# Patient Record
Sex: FEMALE | Race: WHITE | HISPANIC OR LATINO | Employment: UNEMPLOYED | URBAN - METROPOLITAN AREA
[De-identification: names, ages, dates, MRNs, and addresses within clinical notes are randomized per-mention and may not be internally consistent; named-entity substitution may affect disease eponyms.]

---

## 2017-01-30 ENCOUNTER — GENERIC CONVERSION - ENCOUNTER (OUTPATIENT)
Dept: OTHER | Facility: OTHER | Age: 52
End: 2017-01-30

## 2017-04-26 ENCOUNTER — ALLSCRIPTS OFFICE VISIT (OUTPATIENT)
Dept: OTHER | Facility: OTHER | Age: 52
End: 2017-04-26

## 2017-11-15 ENCOUNTER — ALLSCRIPTS OFFICE VISIT (OUTPATIENT)
Dept: OTHER | Facility: OTHER | Age: 52
End: 2017-11-15

## 2018-01-11 NOTE — PSYCH
Psych Med Mgmt    Appearance: was calm and cooperative, adequate hygiene and grooming and good eye contact  Observed mood: depressed and anxious  Observed mood: affect was constricted  Speech: a normal rate and fluent  Thought processes: coherent/organized  Hallucinations: no hallucinations present  Thought Content: no delusions  Abnormal Thoughts: The patient has no suicidal thoughts and no homicidal thoughts  Orientation: The patient is oriented to person, place and time, oriented to person, oriented to place and oriented to time  Recent and Remote Memory: short term memory intact and long term memory intact  Attention Span And Concentration: concentration intact  Insight: Limited insight  Judgment: Her judgment was limited  Muscle Strength And Tone  Muscle strength and tone were normal    The patient is experiencing moderate to severe pain  Goals addressed in session: Medication Management       Treatment Recommendations: Continue current medications  Risks, Benefits And Possible Side Effects Of Medications: Risks, benefits, and possible side effects of medications explained to patient and patient verbalizes understanding  The patient has been filling controlled prescriptions on time as prescribed to [facility] [facility] program       She reports normal appetite, normal energy level, no weight change and normal number of sleep hours  Mood has been depressed, since she lost her father last August, she has been going to her grief, her chronic physical problems  She stated her 's social security check claiming that he was overpaid since he showed being single in his records  Assessment    1  Generalized anxiety disorder (300 02) (F41 1)   2  Severe recurrent major depression without psychotic features (296 33) (F33 2)    Plan    1   From  LamoTRIgine 25 MG Oral Tablet Start 1 tablet po bid for 7 days then   increase to 2 tabs po bid To LamoTRIgine 25 MG Oral Tablet take 2 tablet twice daily    2  ALPRAZolam 2 MG Oral Tablet; TAKE 1 TABLET 3 TIMES DAILY   3  Desvenlafaxine  MG Oral Tablet Extended Release 24 Hour; TAKE 1   TABLET Once In The Morning    Review of Systems    Constitutional: No fever, no chills, feels well, no tiredness, no recent weight gain or loss  Cardiovascular: no complaints of slow or fast heart rate, no chest pain, no palpitations  Respiratory: no complaints of shortness of breath, no wheezing, no dyspnea on exertion  Gastrointestinal: no complaints of abdominal pain, no constipation, no nausea, no diarrhea, no vomiting  Genitourinary: no complaints of dysuria, no incontinence, no pelvic pain, no urinary frequency  Musculoskeletal: no complaints of arthralgia, no myalgias, no limb pain, no joint stiffness  Integumentary: no complaints of skin rash, no itching, no dry skin  Neurological: no complaints of headache, no confusion, no numbness, no dizziness  Substance Abuse Hx    Substance Abuse History: Denies  Active Problems    1  Generalized anxiety disorder (300 02) (F41 1)   2  Severe recurrent major depression without psychotic features (296 33) (F33 2)    Past Medical History    The active problems and past medical history were reviewed and updated today  Allergies    1  Penicillins    Current Meds   1  ALPRAZolam 2 MG Oral Tablet; TAKE 1 TABLET 3 TIMES DAILY; Therapy: 21Jun2016 to (Evaluate:24Apr2018); Last Rx:26Oct2017 Ordered   2  Desvenlafaxine  MG Oral Tablet Extended Release 24 Hour; TAKE 1 TABLET   Once In The Morning; Therapy: 14Apr2014 to ()  Requested for: 26Oct2017; Last   Rx:26Oct2017 Ordered   3  Fluticasone Propionate 50 MCG/ACT Nasal Suspension; Therapy: 08PBT4347 to Recorded   4  LamoTRIgine 25 MG Oral Tablet; Start 1 tablet po bid for 7 days then increase to 2 tabs   po bid;    Therapy: 53Awc2824 to (Evaluate:29Nov2017)  Requested for: 05Zpn0227; Last   Rx:21Qpx8316 Ordered   5  Lisinopril 5 MG Oral Tablet; Therapy: 21Apr2014 to Recorded   6  Lyrica 75 MG Oral Capsule; Therapy: 10TMI7439 to Recorded   7  Methocarbamol 750 MG Oral Tablet; Therapy: 68Wdo1388 to Recorded   8  Naproxen 375 MG Oral Tablet; Therapy: 83EDD0056 to Recorded   9  Naproxen  MG Oral Tablet Delayed Release; Therapy: 47OXJ9947 to Recorded   10  Omeprazole 20 MG Oral Capsule Delayed Release; Therapy: 21Apr2014 to Recorded   11  Ondansetron 4 MG Oral Tablet Disintegrating; Therapy: 41EAA3172 to Recorded   12  ProAir  (90 Base) MCG/ACT Inhalation Aerosol Solution; Therapy: 50FUJ3491 to Recorded   13  RaNITidine HCl - 150 MG Oral Capsule; Therapy: 61ILI7139 to Recorded   14  Simvastatin 20 MG Oral Tablet; Therapy: 62OSC4683 to Recorded   15  Symbicort 160-4 5 MCG/ACT Inhalation Aerosol; Therapy: 72WWI3010 to Recorded    The medication list was reviewed and updated today  Family Psych History    The family history was reviewed and updated today  Social History    · Never A Smoker  The social history was reviewed and updated today  See HPI      End of Encounter Meds    1  LamoTRIgine 25 MG Oral Tablet; take 2 tablet twice daily; Therapy: 63Gew9735 to (Evaluate:56Unp5401)  Requested for: 45RMW5564; Last   Rx:47Wsi4292 Ordered    2  ALPRAZolam 2 MG Oral Tablet; TAKE 1 TABLET 3 TIMES DAILY; Therapy: 21Jun2016 to (Evaluate:78Gjo7922); Last Rx:26Oct2017 Ordered   3  Desvenlafaxine  MG Oral Tablet Extended Release 24 Hour; TAKE 1 TABLET   Once In The Morning; Therapy: 14Apr2014 to ([id number])  Requested for: 83AGL4783; Last   Rx:26Oct2017 Ordered    4  Fluticasone Propionate 50 MCG/ACT Nasal Suspension; Therapy: 67YRD1957 to Recorded   5  Lisinopril 5 MG Oral Tablet; Therapy: 21Apr2014 to Recorded   6  Lyrica 75 MG Oral Capsule; Therapy: 73DCS5248 to Recorded   7   Methocarbamol 750 MG Oral Tablet; Therapy: 16Mhe1898 to Recorded   8  Naproxen 375 MG Oral Tablet; Therapy: 89KKA9666 to Recorded   9  Naproxen  MG Oral Tablet Delayed Release; Therapy: 97YYE7001 to Recorded   10  Omeprazole 20 MG Oral Capsule Delayed Release; Therapy: 75Luc2601 to Recorded   11  Ondansetron 4 MG Oral Tablet Disintegrating; Therapy: 41HEV5042 to Recorded   12  ProAir  (90 Base) MCG/ACT Inhalation Aerosol Solution; Therapy: 82SIJ2166 to Recorded   13  RaNITidine HCl - 150 MG Oral Capsule; Therapy: 70MGZ0543 to Recorded   14  Simvastatin 20 MG Oral Tablet; Therapy: 90EXF7565 to Recorded   15  Symbicort 160-4 5 MCG/ACT Inhalation Aerosol;     Therapy: 24YEB4220 to Recorded    Signatures   Electronically signed by : MOSHE Sewell ; Nov 15 2017 12:02PM EST                       (Author)

## 2018-01-12 NOTE — MISCELLANEOUS
Message  Return to work or school:   John Luong is under my professional care  She was seen in my office on 11/15/2017       Patient is being treated for the following diagnoses:Major Depressive Disorder severe and recurrent without psychosis and Generalized Anxiety Disorder  Belinda Cunningham MD       Signatures   Electronically signed by : MOSHE Harding ; Nov 15 2017 12:01PM EST                       (Author)

## 2018-01-13 NOTE — PSYCH
Psych Med Mgmt    Appearance: was calm and cooperative, adequate hygiene and grooming and good eye contact  Observed mood: depressed and anxious  Observed mood: affect was constricted  Speech: a normal rate and fluent  Thought processes: coherent/organized  Hallucinations: no hallucinations present  Thought Content: no delusions  Abnormal Thoughts: The patient has no suicidal thoughts and no homicidal thoughts  Orientation: The patient is oriented to person, place and time, oriented to person, oriented to place and oriented to time  Recent and Remote Memory: short term memory intact and long term memory intact  Attention Span And Concentration: concentration impaired  Insight: Limited insight  Judgment: Her judgment was limited  Muscle Strength And Tone  Muscle strength and tone were normal         Goals addressed in session: Medication Management       Treatment Recommendations: Continue current medications  Risks, Benefits And Possible Side Effects Of Medications: Risks, benefits, and possible side effects of medications explained to patient and patient verbalizes understanding  She reports decreased appetite, normal energy level, no weight change and decrease in number of sleep hours   Mood has been depressed and anxious  She stated that they have to move to small apartment to simplify her daily routine since she is helping to care for her  that had a stoke and has MS  They are also raising a small child  She feels tense and is unable to get a good night sleep  Assessment    1  Generalized anxiety disorder (300 02) (F41 1)   2  Severe recurrent major depression without psychotic features (296 33) (F33 2)    Plan    1  CarBAMazepine 200 MG Oral Tablet; Take 1 tablet twice daily    2  Diazepam 10 MG Oral Tablet   3   ALPRAZolam 2 MG Oral Tablet; TAKE 1 TABLET 3 TIMES DAILY   4  Desvenlafaxine  MG Oral Tablet Extended Release 24 Hour; TAKE 1   TABLET Once In The Morning    Review of Systems    Constitutional: No fever, no chills, feels well, no tiredness, no recent weight gain or loss and as noted in HPI  Cardiovascular: no complaints of slow or fast heart rate, no chest pain, no palpitations  Respiratory: no complaints of shortness of breath, no wheezing, no dyspnea on exertion  Gastrointestinal: no complaints of abdominal pain, no constipation, no nausea, no diarrhea, no vomiting  Genitourinary: no complaints of dysuria, no incontinence, no pelvic pain, no urinary frequency  Musculoskeletal: no complaints of arthralgia, no myalgias, no limb pain, no joint stiffness  Integumentary: no complaints of skin rash, no itching, no dry skin  Neurological: no complaints of headache, no confusion, no numbness, no dizziness  Substance Abuse Hx    Substance Abuse History: Denies  Active Problems    1  Generalized anxiety disorder (300 02) (F41 1)   2  Severe recurrent major depression without psychotic features (296 33) (F33 2)    Past Medical History    The active problems and past medical history were reviewed and updated today  Allergies    1  Penicillins    Current Meds   1  CarBAMazepine 200 MG Oral Tablet; Take 1 tablet twice daily; Therapy: 21IZG6784 to (Evaluate:67Ebk7442)  Requested for: 25Apr2016; Last   Rx:25Apr2016 Ordered   2  Desvenlafaxine  MG Oral Tablet Extended Release 24 Hour; TAKE 1 TABLET   Once In The Morning; Therapy: 82Chc9314 to (Evaluate:06Jun2016)  Requested for: 13NBF6436; Last   Rx:08Mar2016 Ordered   3  Diazepam 10 MG Oral Tablet; TAKE 1 TABLET 3 TIMES DAILY; Therapy: 63IWQ9194 to (Evaluate:06Jun2016); Last Rx:08Mar2016 Ordered   4  Fluticasone Propionate 50 MCG/ACT Nasal Suspension; Therapy: 82MEN3096 to Recorded   5  Lisinopril 5 MG Oral Tablet; Therapy: 18Xmd2916 to Recorded   6  Lyrica 75 MG Oral Capsule; Therapy: 35KPE6713 to Recorded   7  Methocarbamol 750 MG Oral Tablet;    Therapy: 52Xue4263 to Recorded   8  Naproxen 375 MG Oral Tablet; Therapy: 05DIJ5633 to Recorded   9  Naproxen  MG Oral Tablet Delayed Release; Therapy: 02CJR9867 to Recorded   10  Omeprazole 20 MG Oral Capsule Delayed Release; Therapy: 39Pwz7440 to Recorded   11  Ondansetron 4 MG Oral Tablet Dispersible; Therapy: 48QNV7972 to Recorded   12  ProAir  (90 Base) MCG/ACT Inhalation Aerosol Solution; Therapy: 47PPR5362 to Recorded   13  Ranitidine HCl - 150 MG Oral Capsule; Therapy: 91DXE5062 to Recorded   14  Simvastatin 20 MG Oral Tablet; Therapy: 06JMK9250 to Recorded   15  Symbicort 160-4 5 MCG/ACT Inhalation Aerosol; Therapy: 45FHY5599 to Recorded    The medication list was reviewed and updated today  Family Psych History    The family history was reviewed and updated today  Social History    · Never A Smoker  The social history was reviewed and updated today  End of Encounter Meds    1  CarBAMazepine 200 MG Oral Tablet; Take 1 tablet twice daily; Therapy: 13YAW8768 to (Evaluate:79Qqr6126)  Requested for: 21Jun2016; Last   Rx:21Jun2016 Ordered    2  ALPRAZolam 2 MG Oral Tablet; TAKE 1 TABLET 3 TIMES DAILY; Therapy: 21Jun2016 to (Evaluate:09Brj9889); Last Rx:21Jun2016 Ordered   3  Desvenlafaxine  MG Oral Tablet Extended Release 24 Hour; TAKE 1 TABLET   Once In The Morning; Therapy: 14Apr2014 to (Evaluate:60Xew1076)  Requested for: 21Jun2016; Last   Rx:21Jun2016 Ordered    4  Fluticasone Propionate 50 MCG/ACT Nasal Suspension; Therapy: 43OHE7282 to Recorded   5  Lisinopril 5 MG Oral Tablet; Therapy: 41Ldo8586 to Recorded   6  Lyrica 75 MG Oral Capsule; Therapy: 06QQT3492 to Recorded   7  Methocarbamol 750 MG Oral Tablet; Therapy: 94Oyn3521 to Recorded   8  Naproxen 375 MG Oral Tablet; Therapy: 30UBD8553 to Recorded   9  Naproxen  MG Oral Tablet Delayed Release; Therapy: 27KKG0518 to Recorded   10  Omeprazole 20 MG Oral Capsule Delayed Release;     Therapy: 34Fsf3786 to Recorded   11  Ondansetron 4 MG Oral Tablet Dispersible; Therapy: 38QWO0767 to Recorded   12  ProAir  (90 Base) MCG/ACT Inhalation Aerosol Solution; Therapy: 39FRS8485 to Recorded   13  Ranitidine HCl - 150 MG Oral Capsule; Therapy: 99DTS0718 to Recorded   14  Simvastatin 20 MG Oral Tablet; Therapy: 43OYZ4100 to Recorded   15  Symbicort 160-4 5 MCG/ACT Inhalation Aerosol;     Therapy: 65AQI6008 to Recorded    Signatures   Electronically signed by : MOSHE Medellin ; Jun 21 2016 10:44AM EST                       (Author)

## 2018-01-18 NOTE — PSYCH
Psych Med Mgmt    Appearance: was calm and cooperative, adequate hygiene and grooming and good eye contact  Observed mood: depressed and anxious  Observed mood: affect appropriate  Speech: a normal rate and fluent  Thought processes: coherent/organized  Hallucinations: no hallucinations present  Thought Content: no delusions  Abnormal Thoughts: The patient has no suicidal thoughts and no homicidal thoughts  Orientation: The patient is oriented to person, place and time, oriented to person, oriented to place and oriented to time  Recent and Remote Memory: short term memory intact and long term memory intact  Attention Span And Concentration: concentration intact  Insight: Limited insight  Judgment: Her judgment was limited  Muscle Strength And Tone  Muscle strength and tone were normal    The patient is experiencing moderate to severe pain  Chronic back pain  Goals addressed in session: Medication Management       Treatment Recommendations: Continue current medications  Risks, Benefits And Possible Side Effects Of Medications: Risks, benefits, and possible side effects of medications explained to patient and patient verbalizes understanding  She reports decreased appetite, normal energy level, recent lbs weight loss and normal number of sleep hours  Mood has been stable  She is very busy taking care of her  and her son that is 8 yo  Her  is doing well  She is dealing with chronic pain (back pain) but she will not agree to second surgery since she cannot afford the down time while she recovers  Assessment    1  Severe recurrent major depression without psychotic features (296 33) (F33 2)   2  Generalized anxiety disorder (300 02) (F41 1)    Plan    1  CarBAMazepine 200 MG Oral Tablet; Take 1 tablet twice daily    2  ALPRAZolam 2 MG Oral Tablet; TAKE 1 TABLET 3 TIMES DAILY; Do Not Fill   Before: 62PFA1983   3   Desvenlafaxine  MG Oral Tablet Extended Release 24 Hour; TAKE 1   TABLET Once In The Morning    Review of Systems    Constitutional: as noted in HPI  Substance Abuse Hx    Substance Abuse History: Denies  Active Problems    1  Generalized anxiety disorder (300 02) (F41 1)   2  Severe recurrent major depression without psychotic features (296 33) (F33 2)    Past Medical History    The active problems and past medical history were reviewed and updated today  Allergies    1  Penicillins    Current Meds   1  ALPRAZolam 2 MG Oral Tablet; TAKE 1 TABLET 3 TIMES DAILY; Therapy: 21Jun2016 to (Evaluate:08Jun2017); Last Rx:10Mar2017 Ordered   2  CarBAMazepine 200 MG Oral Tablet; Take 1 tablet twice daily; Therapy: 26NAP4657 to (Evaluate:05Jun2017)  Requested for: 80NSO3429; Last   Rx:07Mar2017 Ordered   3  Desvenlafaxine  MG Oral Tablet Extended Release 24 Hour; TAKE 1 TABLET   Once In The Morning; Therapy: 75Cov1791 to (Evaluate:05Jun2017)  Requested for: 94RGH9851; Last   Rx:07Mar2017 Ordered   4  Fluticasone Propionate 50 MCG/ACT Nasal Suspension; Therapy: 62APC4644 to Recorded   5  Lisinopril 5 MG Oral Tablet; Therapy: 27Wpo6590 to Recorded   6  Lyrica 75 MG Oral Capsule; Therapy: 23AIO8495 to Recorded   7  Methocarbamol 750 MG Oral Tablet; Therapy: 40Qpk2964 to Recorded   8  Naproxen 375 MG Oral Tablet; Therapy: 67HDS1068 to Recorded   9  Naproxen  MG Oral Tablet Delayed Release; Therapy: 42WGI5225 to Recorded   10  Omeprazole 20 MG Oral Capsule Delayed Release; Therapy: 27Eep3110 to Recorded   11  Ondansetron 4 MG Oral Tablet Dispersible; Therapy: 65LPV1999 to Recorded   12  ProAir  (90 Base) MCG/ACT Inhalation Aerosol Solution; Therapy: 96YSG0703 to Recorded   13  RaNITidine HCl - 150 MG Oral Capsule; Therapy: 90XMP9929 to Recorded   14  Simvastatin 20 MG Oral Tablet; Therapy: 50VHL3529 to Recorded   15  Symbicort 160-4 5 MCG/ACT Inhalation Aerosol;     Therapy: 80HKG0740 to Recorded    The medication list was reviewed and updated today  Family Psych History    The family history was reviewed and updated today  Social History    · Never A Smoker  The social history was reviewed and updated today  The social history was reviewed and is unchanged  End of Encounter Meds    1  CarBAMazepine 200 MG Oral Tablet; Take 1 tablet twice daily; Therapy: 73MWS6884 to (Evaluate:04Dec2017)  Requested for: 26Apr2017; Last   Rx:26Apr2017 Ordered    2  ALPRAZolam 2 MG Oral Tablet; TAKE 1 TABLET 3 TIMES DAILY; Therapy: 08Nyw0097 to (Evaluate:38Wkh7958); Last Rx:26Apr2017 Ordered   3  Desvenlafaxine  MG Oral Tablet Extended Release 24 Hour; TAKE 1 TABLET   Once In The Morning; Therapy: 14Apr2014 to (Evaluate:04Dec2017)  Requested for: 26Apr2017; Last   Rx:26Apr2017 Ordered    4  Fluticasone Propionate 50 MCG/ACT Nasal Suspension; Therapy: 62WHC6427 to Recorded   5  Lisinopril 5 MG Oral Tablet; Therapy: 21Apr2014 to Recorded   6  Lyrica 75 MG Oral Capsule; Therapy: 03SZC0980 to Recorded   7  Methocarbamol 750 MG Oral Tablet; Therapy: 10Zto7146 to Recorded   8  Naproxen 375 MG Oral Tablet; Therapy: 25XYG7882 to Recorded   9  Naproxen  MG Oral Tablet Delayed Release; Therapy: 53LMD7404 to Recorded   10  Omeprazole 20 MG Oral Capsule Delayed Release; Therapy: 45Dao8076 to Recorded   11  Ondansetron 4 MG Oral Tablet Dispersible; Therapy: 59IUM0327 to Recorded   12  ProAir  (90 Base) MCG/ACT Inhalation Aerosol Solution; Therapy: 90SMC6541 to Recorded   13  RaNITidine HCl - 150 MG Oral Capsule; Therapy: 20WDP1176 to Recorded   14  Simvastatin 20 MG Oral Tablet; Therapy: 52RJJ1858 to Recorded   15  Symbicort 160-4 5 MCG/ACT Inhalation Aerosol;     Therapy: 64SKB8391 to Recorded    Signatures   Electronically signed by : MOSHE Shabazz ; Apr 26 2017 10:11AM EST                       (Author)

## 2018-03-07 NOTE — PSYCH
Message  Patient No Show Letter - Behavioral Health:     Date: 01/30/2017     Dear Parisa Liao,     We missed seeing you for a scheduled appointment at [facility] on 1-27-17 at 9:40am with Dr Brad Chan  Our goal is to offer the best possible care to our patients, so we are concerned when you are unable to keep a scheduled appointment  Please call us at [phone] so that we can reschedule the appointment for a date and time that will work for you  We understand that circumstances may arise which make it impossible for you to keep a scheduled appointment  Should this happen in the future, please call us as soon as you know the appointment will be missed  The earlier you let us know, the more likely we can offer your scheduled appointment time to another patient  We hope to hear from you soon       Sincerely,   Dr Pete Centeno   Electronically signed by : Dontae Cyr, ; Jan 30 2017  8:53AM EST                       (Author)

## 2018-03-27 ENCOUNTER — TELEPHONE (OUTPATIENT)
Dept: BEHAVIORAL/MENTAL HEALTH CLINIC | Facility: CLINIC | Age: 53
End: 2018-03-27

## 2018-03-27 DIAGNOSIS — F33.2 MDD (MAJOR DEPRESSIVE DISORDER), RECURRENT SEVERE, WITHOUT PSYCHOSIS (HCC): Primary | ICD-10-CM

## 2018-03-27 RX ORDER — DESVENLAFAXINE 100 MG/1
1 TABLET, EXTENDED RELEASE ORAL EVERY MORNING
Qty: 30 TABLET | Refills: 2 | Status: SHIPPED | OUTPATIENT
Start: 2018-03-27 | End: 2018-06-13 | Stop reason: SDUPTHER

## 2018-03-27 RX ORDER — DESVENLAFAXINE 100 MG/1
1 TABLET, EXTENDED RELEASE ORAL EVERY MORNING
COMMUNITY
Start: 2014-04-14 | End: 2018-03-27 | Stop reason: SDUPTHER

## 2018-03-28 ENCOUNTER — TELEPHONE (OUTPATIENT)
Dept: PSYCHIATRY | Facility: CLINIC | Age: 53
End: 2018-03-28

## 2018-03-28 NOTE — TELEPHONE ENCOUNTER
When speaking with Oralia ponce prior auth approval, she related she will need med refills until her May apt:  Lamotrigine and alprazolam  Has medication for now  Can wait until Dr Aydin Hair

## 2018-04-03 DIAGNOSIS — F33.2 MDD (MAJOR DEPRESSIVE DISORDER), RECURRENT SEVERE, WITHOUT PSYCHOSIS (HCC): Primary | ICD-10-CM

## 2018-04-03 RX ORDER — LAMOTRIGINE 25 MG/1
50 TABLET ORAL 2 TIMES DAILY
Qty: 120 TABLET | Refills: 2 | Status: SHIPPED | OUTPATIENT
Start: 2018-04-03 | End: 2018-06-13 | Stop reason: SDUPTHER

## 2018-04-03 RX ORDER — ALPRAZOLAM 2 MG/1
1 TABLET ORAL 3 TIMES DAILY
COMMUNITY
Start: 2016-06-21 | End: 2018-04-03 | Stop reason: SDUPTHER

## 2018-04-03 RX ORDER — LAMOTRIGINE 25 MG/1
2 TABLET ORAL 2 TIMES DAILY
COMMUNITY
Start: 2017-08-31 | End: 2018-04-03 | Stop reason: SDUPTHER

## 2018-04-03 RX ORDER — ALPRAZOLAM 2 MG/1
2 TABLET ORAL 3 TIMES DAILY
Qty: 90 TABLET | Refills: 2 | Status: SHIPPED | OUTPATIENT
Start: 2018-04-03 | End: 2018-06-13 | Stop reason: SDUPTHER

## 2018-04-04 ENCOUNTER — TELEPHONE (OUTPATIENT)
Dept: PSYCHIATRY | Facility: CLINIC | Age: 53
End: 2018-04-04

## 2018-06-13 DIAGNOSIS — F33.2 MDD (MAJOR DEPRESSIVE DISORDER), RECURRENT SEVERE, WITHOUT PSYCHOSIS (HCC): ICD-10-CM

## 2018-06-13 RX ORDER — DESVENLAFAXINE 100 MG/1
1 TABLET, EXTENDED RELEASE ORAL EVERY MORNING
Qty: 30 TABLET | Refills: 2 | Status: SHIPPED | OUTPATIENT
Start: 2018-06-13 | End: 2018-10-22 | Stop reason: SDUPTHER

## 2018-06-13 RX ORDER — LAMOTRIGINE 25 MG/1
50 TABLET ORAL 2 TIMES DAILY
Qty: 120 TABLET | Refills: 2 | Status: SHIPPED | OUTPATIENT
Start: 2018-06-13 | End: 2018-12-20 | Stop reason: SDUPTHER

## 2018-06-13 RX ORDER — ALPRAZOLAM 2 MG/1
2 TABLET ORAL 3 TIMES DAILY
Qty: 90 TABLET | Refills: 2 | Status: SHIPPED | OUTPATIENT
Start: 2018-06-13 | End: 2018-09-05 | Stop reason: SDUPTHER

## 2018-08-10 ENCOUNTER — DOCUMENTATION (OUTPATIENT)
Dept: PSYCHIATRY | Facility: CLINIC | Age: 53
End: 2018-08-10

## 2018-08-10 NOTE — PROGRESS NOTES
Treatment Plan not completed within required time limits due to: cancelled appointment   on 8/6/2018 and No Fu appointment scheduled at this time

## 2018-09-05 DIAGNOSIS — F33.2 MDD (MAJOR DEPRESSIVE DISORDER), RECURRENT SEVERE, WITHOUT PSYCHOSIS (HCC): ICD-10-CM

## 2018-09-05 RX ORDER — ALPRAZOLAM 2 MG/1
2 TABLET ORAL 3 TIMES DAILY
Qty: 90 TABLET | Refills: 0 | Status: SHIPPED | OUTPATIENT
Start: 2018-09-05 | End: 2018-10-22 | Stop reason: SDUPTHER

## 2018-10-09 ENCOUNTER — DOCUMENTATION (OUTPATIENT)
Dept: PSYCHIATRY | Facility: CLINIC | Age: 53
End: 2018-10-09

## 2018-10-09 NOTE — PROGRESS NOTES
Treatment Plan not completed within required time limits due to: cancelled appointment  on 10/10/2018 and RS 12/28/18

## 2018-10-22 DIAGNOSIS — F33.2 MDD (MAJOR DEPRESSIVE DISORDER), RECURRENT SEVERE, WITHOUT PSYCHOSIS (HCC): ICD-10-CM

## 2018-10-23 DIAGNOSIS — F33.2 MDD (MAJOR DEPRESSIVE DISORDER), RECURRENT SEVERE, WITHOUT PSYCHOSIS (HCC): ICD-10-CM

## 2018-10-24 NOTE — TELEPHONE ENCOUNTER
Oralia called today to request her meds be refilled  She mentioned she did call prior to today, but her husbands meds were sent to the pharmacy, and not hers

## 2018-10-25 RX ORDER — ALPRAZOLAM 2 MG/1
TABLET ORAL
Qty: 90 TABLET | Refills: 2 | Status: SHIPPED | OUTPATIENT
Start: 2018-10-25 | End: 2018-12-20 | Stop reason: SDUPTHER

## 2018-10-25 RX ORDER — DESVENLAFAXINE 100 MG/1
1 TABLET, EXTENDED RELEASE ORAL EVERY MORNING
Qty: 30 TABLET | Refills: 0 | Status: SHIPPED | OUTPATIENT
Start: 2018-10-25 | End: 2019-02-22 | Stop reason: SDUPTHER

## 2018-10-25 RX ORDER — ALPRAZOLAM 2 MG/1
2 TABLET ORAL 3 TIMES DAILY
Qty: 90 TABLET | Refills: 0 | Status: SHIPPED | OUTPATIENT
Start: 2018-10-25 | End: 2019-02-22 | Stop reason: SDUPTHER

## 2018-10-25 RX ORDER — DESVENLAFAXINE 100 MG/1
TABLET, EXTENDED RELEASE ORAL
Qty: 30 TABLET | Refills: 1 | Status: SHIPPED | OUTPATIENT
Start: 2018-10-25 | End: 2018-12-20 | Stop reason: SDUPTHER

## 2018-12-20 DIAGNOSIS — F33.2 MDD (MAJOR DEPRESSIVE DISORDER), RECURRENT SEVERE, WITHOUT PSYCHOSIS (HCC): ICD-10-CM

## 2018-12-20 RX ORDER — DESVENLAFAXINE 100 MG/1
1 TABLET, EXTENDED RELEASE ORAL EVERY MORNING
Qty: 30 TABLET | Refills: 2 | Status: SHIPPED | OUTPATIENT
Start: 2018-12-20 | End: 2019-02-22 | Stop reason: SDUPTHER

## 2018-12-20 RX ORDER — LAMOTRIGINE 25 MG/1
50 TABLET ORAL 2 TIMES DAILY
Qty: 120 TABLET | Refills: 2 | Status: SHIPPED | OUTPATIENT
Start: 2018-12-20 | End: 2019-02-22 | Stop reason: SDUPTHER

## 2018-12-20 RX ORDER — ALPRAZOLAM 2 MG/1
2 TABLET ORAL 3 TIMES DAILY
Qty: 90 TABLET | Refills: 2 | Status: SHIPPED | OUTPATIENT
Start: 2018-12-20 | End: 2019-02-22 | Stop reason: SDUPTHER

## 2018-12-24 ENCOUNTER — DOCUMENTATION (OUTPATIENT)
Dept: PSYCHIATRY | Facility: CLINIC | Age: 53
End: 2018-12-24

## 2018-12-24 NOTE — PROGRESS NOTES
# 1Treatment Plan not completed within required time limits due to : Provider will be out of the office 12/28/18  and will reschedule at a later time

## 2019-02-22 ENCOUNTER — OFFICE VISIT (OUTPATIENT)
Dept: PSYCHIATRY | Facility: CLINIC | Age: 54
End: 2019-02-22
Payer: COMMERCIAL

## 2019-02-22 ENCOUNTER — TELEPHONE (OUTPATIENT)
Dept: PSYCHIATRY | Facility: CLINIC | Age: 54
End: 2019-02-22

## 2019-02-22 DIAGNOSIS — F33.2 MDD (MAJOR DEPRESSIVE DISORDER), RECURRENT SEVERE, WITHOUT PSYCHOSIS (HCC): ICD-10-CM

## 2019-02-22 PROBLEM — F31.9 BIPOLAR DISORDER (HCC): Status: ACTIVE | Noted: 2019-02-22

## 2019-02-22 PROBLEM — D51.9 B12 DEFICIENCY ANEMIA: Status: ACTIVE | Noted: 2019-02-22

## 2019-02-22 PROBLEM — E78.2 MIXED HYPERLIPIDEMIA: Status: ACTIVE | Noted: 2017-02-27

## 2019-02-22 PROBLEM — D50.9 IRON DEFICIENCY ANEMIA: Status: ACTIVE | Noted: 2019-02-22

## 2019-02-22 PROBLEM — Z98.84 HISTORY OF BARIATRIC SURGERY: Status: ACTIVE | Noted: 2017-08-09

## 2019-02-22 PROCEDURE — 99213 OFFICE O/P EST LOW 20 MIN: CPT | Performed by: PSYCHIATRY & NEUROLOGY

## 2019-02-22 RX ORDER — ALBUTEROL SULFATE 90 UG/1
AEROSOL, METERED RESPIRATORY (INHALATION)
COMMUNITY
Start: 2015-11-02 | End: 2019-02-22 | Stop reason: SDUPTHER

## 2019-02-22 RX ORDER — AMOXICILLIN 250 MG
CAPSULE ORAL
COMMUNITY
Start: 2019-01-30 | End: 2020-09-23

## 2019-02-22 RX ORDER — ALBUTEROL SULFATE 1.25 MG/3ML
1.25 SOLUTION RESPIRATORY (INHALATION) EVERY 6 HOURS PRN
COMMUNITY
Start: 2017-08-09

## 2019-02-22 RX ORDER — DESVENLAFAXINE 100 MG/1
100 TABLET, EXTENDED RELEASE ORAL DAILY
Qty: 30 TABLET | Refills: 2 | Status: SHIPPED | OUTPATIENT
Start: 2019-02-22 | End: 2019-07-15 | Stop reason: SDUPTHER

## 2019-02-22 RX ORDER — METHOCARBAMOL 750 MG/1
TABLET, FILM COATED ORAL
COMMUNITY
Start: 2014-08-05

## 2019-02-22 RX ORDER — BUDESONIDE AND FORMOTEROL FUMARATE DIHYDRATE 160; 4.5 UG/1; UG/1
AEROSOL RESPIRATORY (INHALATION)
COMMUNITY
Start: 2018-11-01

## 2019-02-22 RX ORDER — DESVENLAFAXINE 50 MG/1
100 TABLET, EXTENDED RELEASE ORAL DAILY
COMMUNITY
Start: 2013-11-05 | End: 2019-02-22 | Stop reason: SDUPTHER

## 2019-02-22 RX ORDER — SIMVASTATIN 20 MG
20 TABLET ORAL
COMMUNITY
Start: 2014-07-21

## 2019-02-22 RX ORDER — ALPRAZOLAM 2 MG/1
2 TABLET ORAL 3 TIMES DAILY
Qty: 90 TABLET | Refills: 2 | Status: SHIPPED | OUTPATIENT
Start: 2019-02-22 | End: 2019-07-15 | Stop reason: SDUPTHER

## 2019-02-22 RX ORDER — RANITIDINE 150 MG/1
150 CAPSULE ORAL
COMMUNITY
Start: 2015-07-06

## 2019-02-22 RX ORDER — LAMOTRIGINE 25 MG/1
50 TABLET ORAL 2 TIMES DAILY
Qty: 120 TABLET | Refills: 2 | Status: SHIPPED | OUTPATIENT
Start: 2019-02-22 | End: 2019-07-15 | Stop reason: SDUPTHER

## 2019-02-22 RX ORDER — ASPIRIN 325 MG
325 TABLET ORAL DAILY
COMMUNITY
Start: 2019-01-30 | End: 2019-07-15 | Stop reason: SDUPTHER

## 2019-02-22 RX ORDER — FLUTICASONE PROPIONATE 50 MCG
2 SPRAY, SUSPENSION (ML) NASAL DAILY
COMMUNITY
Start: 2015-06-27

## 2019-02-22 RX ORDER — DESVENLAFAXINE 50 MG/1
100 TABLET, EXTENDED RELEASE ORAL DAILY
Qty: 30 TABLET | Refills: 2 | Status: SHIPPED | OUTPATIENT
Start: 2019-02-22 | End: 2019-02-22 | Stop reason: SDUPTHER

## 2019-02-22 RX ORDER — CARBAMAZEPINE 200 MG/1
200 TABLET, EXTENDED RELEASE ORAL 2 TIMES DAILY
COMMUNITY
Start: 2013-11-05 | End: 2019-02-22 | Stop reason: ALTCHOICE

## 2019-02-22 RX ORDER — ONDANSETRON 4 MG/1
TABLET, ORALLY DISINTEGRATING ORAL
COMMUNITY
Start: 2014-07-19

## 2019-02-22 RX ORDER — OXYCODONE HYDROCHLORIDE 15 MG/1
TABLET ORAL
COMMUNITY
Start: 2016-03-06 | End: 2020-09-23

## 2019-02-22 RX ORDER — OMEPRAZOLE 20 MG/1
CAPSULE, DELAYED RELEASE ORAL
COMMUNITY
Start: 2014-04-21

## 2019-02-22 NOTE — TELEPHONE ENCOUNTER
Mary from [facility] pharmacy called to get clarification on Pristix Rx  Pharmacist would like to know if you meant to give a 15 day supply? And if they can give her the 100mg  Tablet  Instead of two 50mg  Tablets  Pharmacist also inquired about any discontinued medications because she may have to get an override to see if the pristix can be filled      [phone]
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
## 2019-05-22 ENCOUNTER — DOCUMENTATION (OUTPATIENT)
Dept: PSYCHIATRY | Facility: CLINIC | Age: 54
End: 2019-05-22

## 2019-07-15 ENCOUNTER — OFFICE VISIT (OUTPATIENT)
Dept: PSYCHIATRY | Facility: CLINIC | Age: 54
End: 2019-07-15
Payer: COMMERCIAL

## 2019-07-15 DIAGNOSIS — F33.2 MDD (MAJOR DEPRESSIVE DISORDER), RECURRENT SEVERE, WITHOUT PSYCHOSIS (HCC): ICD-10-CM

## 2019-07-15 DIAGNOSIS — F41.1 GENERALIZED ANXIETY DISORDER: Primary | ICD-10-CM

## 2019-07-15 DIAGNOSIS — F31.63 BIPOLAR DISORDER, CURRENT EPISODE MIXED, SEVERE, WITHOUT PSYCHOTIC FEATURES (HCC): ICD-10-CM

## 2019-07-15 PROBLEM — Z86.73 HISTORY OF CVA (CEREBROVASCULAR ACCIDENT): Status: ACTIVE | Noted: 2019-07-12

## 2019-07-15 PROCEDURE — 99213 OFFICE O/P EST LOW 20 MIN: CPT | Performed by: PSYCHIATRY & NEUROLOGY

## 2019-07-15 RX ORDER — ALPRAZOLAM 2 MG/1
2 TABLET ORAL 3 TIMES DAILY
Qty: 90 TABLET | Refills: 2 | Status: SHIPPED | OUTPATIENT
Start: 2019-07-15 | End: 2019-10-07 | Stop reason: SDUPTHER

## 2019-07-15 RX ORDER — ASPIRIN 325 MG
325 TABLET ORAL DAILY
COMMUNITY
Start: 2019-07-12

## 2019-07-15 RX ORDER — LISINOPRIL 5 MG/1
2.5 TABLET ORAL DAILY
COMMUNITY
Start: 2019-07-12

## 2019-07-15 RX ORDER — OMEPRAZOLE 20 MG/1
CAPSULE, DELAYED RELEASE ORAL
COMMUNITY
Start: 2019-07-12 | End: 2019-07-15 | Stop reason: SDUPTHER

## 2019-07-15 RX ORDER — LAMOTRIGINE 100 MG/1
100 TABLET ORAL 2 TIMES DAILY
Qty: 60 TABLET | Refills: 2 | Status: SHIPPED | OUTPATIENT
Start: 2019-07-15 | End: 2019-10-01 | Stop reason: SDUPTHER

## 2019-07-15 RX ORDER — DESVENLAFAXINE 100 MG/1
100 TABLET, EXTENDED RELEASE ORAL DAILY
Qty: 30 TABLET | Refills: 2 | Status: SHIPPED | OUTPATIENT
Start: 2019-07-15 | End: 2019-10-01 | Stop reason: SDUPTHER

## 2019-07-15 RX ORDER — FERROUS SULFATE 325(65) MG
325 TABLET ORAL
COMMUNITY
Start: 2019-07-12

## 2019-07-15 RX ORDER — SENNA AND DOCUSATE SODIUM 50; 8.6 MG/1; MG/1
TABLET, FILM COATED ORAL
COMMUNITY
Start: 2019-07-12 | End: 2019-07-15 | Stop reason: SDUPTHER

## 2019-07-15 NOTE — PSYCH
Subjective: Medication Management      Patient ID: Shonna Interiano is a 48 y o  female  HPI ROS Appetite Changes and Sleep: normal appetite, normal energy level, no weight change and normal number of sleep hours  Patient stated she has been experiencing more of the mood swings after stopping Tegretol and will like to know if the dose of Lamictal could be adjusted  Agree to increase Lamictal to 100 mg bid  Denies medication side effects  No recent health changes or new medications  Review Of Systems:     Mood Anxiety, Depression and Emotional Lability   Behavior Normal    Thought Content Disturbing Thoughts, Feelings and Unreasonalbe or Irrational Fears   General Emotional Problems and Decreased Functioning   Personality Normal   Other Psych Symptoms Normal   Constitutional Negative   ENT Negative   Cardiovascular Negative   Respiratory Negative   Gastrointestinal Negative   Genitourinary Negative   Musculoskeletal Negative   Integumentary Negative   Neurological Negative   Endocrine Normal    Other Symptoms Normal              Laboratory Results: No results found for this or any previous visit  Substance Abuse History:  Social History     Substance and Sexual Activity   Drug Use Not on file       Family Psychiatric History: No family history on file      The following portions of the patient's history were reviewed and updated as appropriate: allergies, current medications, past family history, past medical history, past social history, past surgical history and problem list     Social History     Socioeconomic History    Marital status: /Civil Union     Spouse name: Not on file    Number of children: Not on file    Years of education: Not on file    Highest education level: Not on file   Occupational History    Not on file   Social Needs    Financial resource strain: Not on file    Food insecurity:     Worry: Not on file     Inability: Not on file    Transportation needs:     Medical: Not on file     Non-medical: Not on file   Tobacco Use    Smoking status: Not on file   Substance and Sexual Activity    Alcohol use: Not on file    Drug use: Not on file    Sexual activity: Not on file   Lifestyle    Physical activity:     Days per week: Not on file     Minutes per session: Not on file    Stress: Not on file   Relationships    Social connections:     Talks on phone: Not on file     Gets together: Not on file     Attends Cheondoism service: Not on file     Active member of club or organization: Not on file     Attends meetings of clubs or organizations: Not on file     Relationship status: Not on file    Intimate partner violence:     Fear of current or ex partner: Not on file     Emotionally abused: Not on file     Physically abused: Not on file     Forced sexual activity: Not on file   Other Topics Concern    Not on file   Social History Narrative    Not on file     Social History     Social History Narrative    Not on file       Objective:       Mental status:  Appearance calm and cooperative , adequate hygiene and grooming and good eye contact    Mood dysphoric and depressed   Affect affect was constricted   Speech a normal rate and fluent   Thought Processes coherent/organized and normal thought processes   Hallucinations no hallucinations present    Thought Content no delusions   Abnormal Thoughts no suicidal thoughts  and no homicidal thoughts    Orientation  oriented to person and place and time   Remote Memory short term memory intact and long term memory intact   Attention Span concentration intact   Intellect Appears to be of Average Intelligence   Insight Limited insight   Judgement judgment was limited   Muscle Strength Muscle strength and tone were normal and Normal gait    Language no difficulty naming common objects, no difficulty repeating a phrase  and no difficulty writing a sentence    Fund of Knowledge displays adequate knowledge of current events, adequate fund of knowledge regarding past history and adequate fund of knowledge regarding vocabulary    Pain moderate to severe   Pain Scale 8       Assessment/Plan:       There are no diagnoses linked to this encounter  Treatment Recommendations- Risks Benefits      Immediate Medical/Psychiatric/Psychotherapy Treatments and Any Precautions: increase Lamictal to 100 mg bid     Risks, Benefits And Possible Side Effects Of Medications:  {PSYCH RISK, BENEFITS AND POSSIBLE SIDE EFFECTS (Optional):55196    Controlled Medication Discussion: Discussed with patient Black Box warning on concurrent use of benzodiazepines and opioid medications including sedation, respiratory depression, coma and death  Patient understands the risk of treatment with benzodiazepines in addition to opioids and wants to continue taking those medications  , Discussed with patient the risks of sedation, respiratory depression, impairment of ability to drive and potential for abuse and addiction related to treatment with benzodiazepine medications  The patient understands risk of treatment with benzodiazepine medications, agrees to not drive if feels impaired and agrees to take medications as prescribed   and The patient has been filling controlled prescriptions on time as prescribed to [facility]  program

## 2019-07-15 NOTE — BH TREATMENT PLAN
TREATMENT PLAN (Medication Management Only)        [facility]    Name and Date of Birth:  Meagan Allen 48 y o  1965  Date of Treatment Plan: July 15, 2019  Diagnosis/Diagnoses:    1  Generalized anxiety disorder    3  Bipolar disorder, current episode mixed, severe, without psychotic features [facility])      Strengths/Personal Resources for Self-Care: supportive family, taking medications as prescribed, ability to communicate needs, motivation for treatment  Area/Areas of need (in own words): anxiety, anxiety symptoms, depression, depressive symptoms, mood swings  1  Long Term Goal: continue improvement in mood stability  Target Date: 2 months - 9/15/2019  Person/Persons responsible for completion of goal: Oralia  2  Short Term Objective (s) - How will we reach this goal?:   A  Provider new recommended medication/dosage changes and/or continue medication(s): continue current medications as prescribed (see medication list)  B  N/A   C  N/A  Target Date: 3 months - 10/15/2019  Person/Persons Responsible for Completion of Goal: Crystal  Progress Towards Goals: continuing treatment  Treatment Modality: medication management every 3 months  Review due 90 to 120 days from date of this plan: 3 months - 10/15/2019  Expected length of service: maintenance  My Physician/PA/NP and I have developed this plan together and I agree to work on the goals and objectives  I understand the treatment goals that were developed for my treatment

## 2019-10-01 DIAGNOSIS — F33.2 MDD (MAJOR DEPRESSIVE DISORDER), RECURRENT SEVERE, WITHOUT PSYCHOSIS (HCC): ICD-10-CM

## 2019-10-01 RX ORDER — LAMOTRIGINE 100 MG/1
TABLET ORAL
Qty: 60 TABLET | Refills: 1 | Status: SHIPPED | OUTPATIENT
Start: 2019-10-01 | End: 2019-12-05 | Stop reason: SDUPTHER

## 2019-10-01 RX ORDER — DESVENLAFAXINE 100 MG/1
TABLET, EXTENDED RELEASE ORAL
Qty: 30 TABLET | Refills: 1 | Status: SHIPPED | OUTPATIENT
Start: 2019-10-01 | End: 2019-12-05 | Stop reason: SDUPTHER

## 2019-10-07 DIAGNOSIS — F33.2 MDD (MAJOR DEPRESSIVE DISORDER), RECURRENT SEVERE, WITHOUT PSYCHOSIS (HCC): ICD-10-CM

## 2019-10-07 RX ORDER — ALPRAZOLAM 2 MG/1
2 TABLET ORAL 3 TIMES DAILY
Qty: 90 TABLET | Refills: 2 | Status: SHIPPED | OUTPATIENT
Start: 2019-10-07 | End: 2020-01-03 | Stop reason: SDUPTHER

## 2019-12-05 DIAGNOSIS — F33.2 MDD (MAJOR DEPRESSIVE DISORDER), RECURRENT SEVERE, WITHOUT PSYCHOSIS (HCC): ICD-10-CM

## 2019-12-06 ENCOUNTER — TELEPHONE (OUTPATIENT)
Dept: PSYCHIATRY | Facility: CLINIC | Age: 54
End: 2019-12-06

## 2019-12-06 RX ORDER — DESVENLAFAXINE 100 MG/1
1 TABLET, EXTENDED RELEASE ORAL DAILY
Qty: 30 TABLET | Refills: 1 | Status: SHIPPED | OUTPATIENT
Start: 2019-12-06 | End: 2020-09-23 | Stop reason: SDUPTHER

## 2019-12-06 RX ORDER — LAMOTRIGINE 100 MG/1
100 TABLET ORAL 2 TIMES DAILY
Qty: 60 TABLET | Refills: 1 | Status: SHIPPED | OUTPATIENT
Start: 2019-12-06 | End: 2020-09-23 | Stop reason: SDUPTHER

## 2019-12-06 NOTE — TELEPHONE ENCOUNTER
Nursing made patient aware that Dr Tashi Jimenez sent prescriptions to her pharmacy for Lamictal and Desvenlafaxine

## 2019-12-12 DIAGNOSIS — F33.2 MDD (MAJOR DEPRESSIVE DISORDER), RECURRENT SEVERE, WITHOUT PSYCHOSIS (HCC): ICD-10-CM

## 2019-12-12 RX ORDER — DESVENLAFAXINE 100 MG/1
TABLET, EXTENDED RELEASE ORAL
Qty: 30 TABLET | Refills: 2 | Status: SHIPPED | OUTPATIENT
Start: 2019-12-12 | End: 2020-01-03 | Stop reason: SDUPTHER

## 2019-12-12 RX ORDER — LAMOTRIGINE 100 MG/1
TABLET ORAL
Qty: 60 TABLET | Refills: 2 | Status: SHIPPED | OUTPATIENT
Start: 2019-12-12 | End: 2020-09-23 | Stop reason: SDUPTHER

## 2020-01-03 DIAGNOSIS — F33.2 MDD (MAJOR DEPRESSIVE DISORDER), RECURRENT SEVERE, WITHOUT PSYCHOSIS (HCC): ICD-10-CM

## 2020-01-03 RX ORDER — ALPRAZOLAM 2 MG/1
2 TABLET ORAL 3 TIMES DAILY
Qty: 90 TABLET | Refills: 2 | Status: SHIPPED | OUTPATIENT
Start: 2020-01-03 | End: 2020-05-04 | Stop reason: SDUPTHER

## 2020-01-03 RX ORDER — DESVENLAFAXINE 100 MG/1
100 TABLET, EXTENDED RELEASE ORAL DAILY
Qty: 30 TABLET | Refills: 2 | Status: SHIPPED | OUTPATIENT
Start: 2020-01-03 | End: 2020-06-01

## 2020-01-15 ENCOUNTER — DOCUMENTATION (OUTPATIENT)
Dept: PSYCHIATRY | Facility: CLINIC | Age: 55
End: 2020-01-15

## 2020-01-15 NOTE — PROGRESS NOTES
Treatment Plan not completed within required time limits due to: Alondra Chan  cancelled appointment  on 1/13/2020 (called and l/m with service to cancel 9 am appointment today)

## 2020-03-18 ENCOUNTER — TELEPHONE (OUTPATIENT)
Dept: PSYCHIATRY | Facility: CLINIC | Age: 55
End: 2020-03-18

## 2020-03-24 ENCOUNTER — DOCUMENTATION (OUTPATIENT)
Dept: PSYCHIATRY | Facility: CLINIC | Age: 55
End: 2020-03-24
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
## 2020-05-04 DIAGNOSIS — F33.2 MDD (MAJOR DEPRESSIVE DISORDER), RECURRENT SEVERE, WITHOUT PSYCHOSIS (HCC): ICD-10-CM

## 2020-05-04 RX ORDER — ALPRAZOLAM 2 MG/1
2 TABLET ORAL 3 TIMES DAILY
Qty: 90 TABLET | Refills: 2 | Status: SHIPPED | OUTPATIENT
Start: 2020-05-04 | End: 2020-07-24 | Stop reason: SDUPTHER

## 2020-06-01 DIAGNOSIS — F33.2 MDD (MAJOR DEPRESSIVE DISORDER), RECURRENT SEVERE, WITHOUT PSYCHOSIS (HCC): ICD-10-CM

## 2020-06-01 RX ORDER — DESVENLAFAXINE 100 MG/1
TABLET, EXTENDED RELEASE ORAL
Qty: 30 TABLET | Refills: 2 | Status: SHIPPED | OUTPATIENT
Start: 2020-06-01 | End: 2020-08-18

## 2020-07-24 DIAGNOSIS — F33.2 MDD (MAJOR DEPRESSIVE DISORDER), RECURRENT SEVERE, WITHOUT PSYCHOSIS (HCC): ICD-10-CM

## 2020-07-24 RX ORDER — ALPRAZOLAM 2 MG/1
2 TABLET ORAL 3 TIMES DAILY
Qty: 90 TABLET | Refills: 2 | Status: SHIPPED | OUTPATIENT
Start: 2020-07-24 | End: 2020-09-23 | Stop reason: SDUPTHER

## 2020-08-18 DIAGNOSIS — F33.2 MDD (MAJOR DEPRESSIVE DISORDER), RECURRENT SEVERE, WITHOUT PSYCHOSIS (HCC): ICD-10-CM

## 2020-08-18 RX ORDER — DESVENLAFAXINE 100 MG/1
TABLET, EXTENDED RELEASE ORAL
Qty: 30 TABLET | Refills: 1 | Status: SHIPPED | OUTPATIENT
Start: 2020-08-18 | End: 2020-09-23 | Stop reason: SDUPTHER

## 2020-09-23 ENCOUNTER — TELEMEDICINE (OUTPATIENT)
Dept: PSYCHIATRY | Facility: CLINIC | Age: 55
End: 2020-09-23
Payer: COMMERCIAL

## 2020-09-23 DIAGNOSIS — F33.2 MDD (MAJOR DEPRESSIVE DISORDER), RECURRENT SEVERE, WITHOUT PSYCHOSIS (HCC): ICD-10-CM

## 2020-09-23 PROBLEM — R53.83 FATIGUE: Status: ACTIVE | Noted: 2020-04-27

## 2020-09-23 PROBLEM — G56.03 CARPAL TUNNEL SYNDROME, BILATERAL: Status: ACTIVE | Noted: 2020-04-27

## 2020-09-23 PROBLEM — M25.473 ANKLE SWELLING: Status: ACTIVE | Noted: 2020-04-27

## 2020-09-23 PROBLEM — M25.571 ACUTE RIGHT ANKLE PAIN: Status: ACTIVE | Noted: 2020-04-27

## 2020-09-23 PROCEDURE — 99213 OFFICE O/P EST LOW 20 MIN: CPT | Performed by: PSYCHIATRY & NEUROLOGY

## 2020-09-23 RX ORDER — LAMOTRIGINE 100 MG/1
100 TABLET ORAL 2 TIMES DAILY
Qty: 60 TABLET | Refills: 2 | Status: SHIPPED | OUTPATIENT
Start: 2020-09-23 | End: 2020-12-08

## 2020-09-23 RX ORDER — DESVENLAFAXINE 100 MG/1
100 TABLET, EXTENDED RELEASE ORAL DAILY
Qty: 30 TABLET | Refills: 2 | Status: SHIPPED | OUTPATIENT
Start: 2020-09-23 | End: 2020-10-26

## 2020-09-23 RX ORDER — DOXEPIN HYDROCHLORIDE 50 MG/1
CAPSULE ORAL
COMMUNITY
Start: 2020-07-09

## 2020-09-23 RX ORDER — BUPRENORPHINE AND NALOXONE 2; .5 MG/1; MG/1
FILM, SOLUBLE BUCCAL; SUBLINGUAL
COMMUNITY
Start: 2020-09-21 | End: 2020-09-23 | Stop reason: SDUPTHER

## 2020-09-23 RX ORDER — BUPRENORPHINE AND NALOXONE 4; 1 MG/1; MG/1
FILM, SOLUBLE BUCCAL; SUBLINGUAL
COMMUNITY
Start: 2020-07-27

## 2020-09-23 RX ORDER — AMOXICILLIN 250 MG
CAPSULE ORAL
COMMUNITY
Start: 2020-06-23

## 2020-09-23 RX ORDER — ALPRAZOLAM 2 MG/1
2 TABLET ORAL 3 TIMES DAILY
Qty: 90 TABLET | Refills: 2 | Status: SHIPPED | OUTPATIENT
Start: 2020-09-23 | End: 2021-01-18 | Stop reason: SDUPTHER

## 2020-09-23 RX ORDER — BUPRENORPHINE AND NALOXONE 8; 2 MG/1; MG/1
FILM, SOLUBLE BUCCAL; SUBLINGUAL
COMMUNITY
Start: 2020-09-21 | End: 2020-09-23 | Stop reason: SDUPTHER

## 2020-09-23 RX ORDER — CHOLECALCIFEROL (VITAMIN D3) 125 MCG
CAPSULE ORAL
COMMUNITY
Start: 2020-07-09

## 2020-09-23 RX ORDER — PANTOPRAZOLE SODIUM 40 MG/1
40 TABLET, DELAYED RELEASE ORAL DAILY
COMMUNITY
Start: 2020-07-03 | End: 2021-07-03

## 2020-09-23 RX ORDER — FAMOTIDINE 20 MG/1
20 TABLET, FILM COATED ORAL 2 TIMES DAILY
COMMUNITY
Start: 2020-04-27 | End: 2021-04-27

## 2020-09-23 NOTE — BH TREATMENT PLAN
TREATMENT PLAN (Medication Management Only)        [address]    Name and Date of Birth:  Khalida Foreman 50 y o  1965  Date of Treatment Plan: September 23, 2020  Diagnosis/Diagnoses:    1  MDD (major depressive disorder), recurrent severe, without psychosis ([facility] [address] )      Strengths/Personal Resources for Self-Care: taking medications as prescribed, ability to communicate needs  Area/Areas of need (in own words): anxiety, anxiety symptoms, depression, depressive symptoms, mood instability  1  Long Term Goal: continue improvement in mood stability  Target Date:3 months - 12/23/2020  Person/Persons responsible for completion of goal: Oralia  2  Short Term Objective (s) - How will we reach this goal?:   A  Provider new recommended medication/dosage changes and/or continue medication(s): continue current medications as prescribed  B  N/A   C  N/A  Target Date:3 months - 12/23/2020  Person/Persons Responsible for Completion of Goal: Crystal  Progress Towards Goals: continuing treatment  Treatment Modality: medication management every 3 months  Review due 180 days from date of this plan: 6 months  Expected length of service: ongoing treatment  My Physician/PA/NP and I have developed this plan together and I agree to work on the goals and objectives  I understand the treatment goals that were developed for my treatment
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
## 2020-10-21 ENCOUNTER — TELEPHONE (OUTPATIENT)
Dept: PSYCHIATRY | Facility: CLINIC | Age: 55
End: 2020-10-21

## 2020-10-23 ENCOUNTER — TELEPHONE (OUTPATIENT)
Dept: PSYCHIATRY | Facility: CLINIC | Age: 55
End: 2020-10-23

## 2020-10-26 ENCOUNTER — TELEPHONE (OUTPATIENT)
Dept: PSYCHIATRY | Facility: CLINIC | Age: 55
End: 2020-10-26

## 2020-10-26 DIAGNOSIS — F41.1 GENERALIZED ANXIETY DISORDER: Primary | ICD-10-CM

## 2020-10-26 DIAGNOSIS — F31.64 BIPOLAR DISORDER, CURRENT EPISODE MIXED, SEVERE, WITH PSYCHOTIC FEATURES (HCC): ICD-10-CM

## 2020-10-26 RX ORDER — DULOXETIN HYDROCHLORIDE 30 MG/1
30 CAPSULE, DELAYED RELEASE ORAL DAILY
Qty: 30 CAPSULE | Refills: 0 | Status: SHIPPED | OUTPATIENT
Start: 2020-10-26 | End: 2020-11-24

## 2020-10-27 ENCOUNTER — TELEMEDICINE (OUTPATIENT)
Dept: PSYCHIATRY | Facility: CLINIC | Age: 55
End: 2020-10-27
Payer: COMMERCIAL

## 2020-10-27 DIAGNOSIS — F41.1 GENERALIZED ANXIETY DISORDER: Primary | ICD-10-CM

## 2020-10-27 PROCEDURE — 99213 OFFICE O/P EST LOW 20 MIN: CPT | Performed by: PSYCHIATRY & NEUROLOGY

## 2020-10-27 RX ORDER — BUPRENORPHINE AND NALOXONE 8; 2 MG/1; MG/1
FILM, SOLUBLE BUCCAL; SUBLINGUAL
COMMUNITY
Start: 2020-10-19

## 2020-10-27 RX ORDER — AMOXICILLIN 250 MG
CAPSULE ORAL
COMMUNITY
Start: 2020-10-14 | End: 2021-03-29

## 2020-10-27 RX ORDER — ERGOCALCIFEROL 1.25 MG/1
CAPSULE ORAL
COMMUNITY
Start: 2020-10-26

## 2020-10-27 RX ORDER — BUPRENORPHINE AND NALOXONE 2; .5 MG/1; MG/1
FILM, SOLUBLE BUCCAL; SUBLINGUAL
COMMUNITY
Start: 2020-10-19

## 2020-10-27 RX ORDER — DESVENLAFAXINE 50 MG/1
50 TABLET, EXTENDED RELEASE ORAL DAILY
Qty: 14 TABLET | Refills: 0 | Status: SHIPPED | OUTPATIENT
Start: 2020-10-27 | End: 2021-07-22

## 2020-11-23 DIAGNOSIS — F31.64 BIPOLAR DISORDER, CURRENT EPISODE MIXED, SEVERE, WITH PSYCHOTIC FEATURES (HCC): ICD-10-CM

## 2020-11-24 ENCOUNTER — TELEPHONE (OUTPATIENT)
Dept: PSYCHIATRY | Facility: CLINIC | Age: 55
End: 2020-11-24

## 2020-11-24 DIAGNOSIS — F41.1 GENERALIZED ANXIETY DISORDER: Primary | ICD-10-CM

## 2020-11-24 RX ORDER — DESVENLAFAXINE 50 MG/1
50 TABLET, EXTENDED RELEASE ORAL DAILY
Qty: 30 TABLET | Refills: 2 | Status: SHIPPED | OUTPATIENT
Start: 2020-11-24 | End: 2021-02-23

## 2020-11-24 RX ORDER — DULOXETIN HYDROCHLORIDE 30 MG/1
CAPSULE, DELAYED RELEASE ORAL
Qty: 30 CAPSULE | Refills: 2 | Status: SHIPPED | OUTPATIENT
Start: 2020-11-24 | End: 2020-11-24

## 2020-11-25 ENCOUNTER — TELEPHONE (OUTPATIENT)
Dept: PSYCHIATRY | Facility: CLINIC | Age: 55
End: 2020-11-25

## 2020-11-30 ENCOUNTER — TELEPHONE (OUTPATIENT)
Dept: PSYCHIATRY | Facility: CLINIC | Age: 55
End: 2020-11-30

## 2020-12-08 DIAGNOSIS — F33.2 MDD (MAJOR DEPRESSIVE DISORDER), RECURRENT SEVERE, WITHOUT PSYCHOSIS (HCC): ICD-10-CM

## 2020-12-08 RX ORDER — LAMOTRIGINE 100 MG/1
TABLET ORAL
Qty: 60 TABLET | Refills: 1 | Status: SHIPPED | OUTPATIENT
Start: 2020-12-08 | End: 2021-01-26

## 2020-12-09 ENCOUNTER — DOCUMENTATION (OUTPATIENT)
Dept: PSYCHIATRY | Facility: CLINIC | Age: 55
End: 2020-12-09

## 2021-01-06 ENCOUNTER — TELEPHONE (OUTPATIENT)
Dept: PSYCHIATRY | Facility: CLINIC | Age: 56
End: 2021-01-06
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
## 2021-01-18 DIAGNOSIS — F33.2 MDD (MAJOR DEPRESSIVE DISORDER), RECURRENT SEVERE, WITHOUT PSYCHOSIS (HCC): ICD-10-CM

## 2021-01-19 RX ORDER — ALPRAZOLAM 2 MG/1
2 TABLET ORAL 3 TIMES DAILY
Qty: 90 TABLET | Refills: 2 | Status: SHIPPED | OUTPATIENT
Start: 2021-01-19 | End: 2021-05-19 | Stop reason: SDUPTHER

## 2021-01-26 DIAGNOSIS — F33.2 MDD (MAJOR DEPRESSIVE DISORDER), RECURRENT SEVERE, WITHOUT PSYCHOSIS (HCC): ICD-10-CM

## 2021-01-26 RX ORDER — LAMOTRIGINE 100 MG/1
TABLET ORAL
Qty: 60 TABLET | Refills: 2 | Status: SHIPPED | OUTPATIENT
Start: 2021-01-26 | End: 2021-04-13

## 2021-02-20 DIAGNOSIS — F41.1 GENERALIZED ANXIETY DISORDER: ICD-10-CM

## 2021-02-23 RX ORDER — DESVENLAFAXINE 50 MG/1
TABLET, EXTENDED RELEASE ORAL
Qty: 30 TABLET | Refills: 2 | Status: SHIPPED | OUTPATIENT
Start: 2021-02-23 | End: 2021-05-11

## 2021-04-13 DIAGNOSIS — F33.2 MDD (MAJOR DEPRESSIVE DISORDER), RECURRENT SEVERE, WITHOUT PSYCHOSIS (HCC): ICD-10-CM

## 2021-04-13 RX ORDER — LAMOTRIGINE 100 MG/1
TABLET ORAL
Qty: 60 TABLET | Refills: 1 | Status: SHIPPED | OUTPATIENT
Start: 2021-04-13 | End: 2021-06-15

## 2021-05-11 DIAGNOSIS — F41.1 GENERALIZED ANXIETY DISORDER: ICD-10-CM

## 2021-05-11 RX ORDER — DESVENLAFAXINE 50 MG/1
TABLET, EXTENDED RELEASE ORAL
Qty: 30 TABLET | Refills: 1 | Status: SHIPPED | OUTPATIENT
Start: 2021-05-11 | End: 2021-06-29

## 2021-05-19 ENCOUNTER — TELEPHONE (OUTPATIENT)
Dept: PSYCHIATRY | Facility: CLINIC | Age: 56
End: 2021-05-19

## 2021-05-19 DIAGNOSIS — F33.2 MDD (MAJOR DEPRESSIVE DISORDER), RECURRENT SEVERE, WITHOUT PSYCHOSIS (HCC): ICD-10-CM

## 2021-05-19 RX ORDER — ALPRAZOLAM 2 MG/1
2 TABLET ORAL 3 TIMES DAILY
Qty: 21 TABLET | Refills: 0 | Status: SHIPPED | OUTPATIENT
Start: 2021-05-19 | End: 2021-05-24 | Stop reason: SDUPTHER

## 2021-05-19 NOTE — TELEPHONE ENCOUNTER
Will ask covering provider to review in [address] absence   Last seen 12/3/2020, No show 3/29/21, and Next appointment 7/22/21
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
## 2021-05-24 DIAGNOSIS — F33.2 MDD (MAJOR DEPRESSIVE DISORDER), RECURRENT SEVERE, WITHOUT PSYCHOSIS (HCC): ICD-10-CM

## 2021-05-24 RX ORDER — ALPRAZOLAM 2 MG/1
2 TABLET ORAL 3 TIMES DAILY
Qty: 90 TABLET | Refills: 2 | Status: SHIPPED | OUTPATIENT
Start: 2021-05-24 | End: 2021-09-08 | Stop reason: SDUPTHER

## 2021-05-24 NOTE — TELEPHONE ENCOUNTER
Patient called stating her pharmacy only received a 7 day supply for prescription of alprazolam  Now that you are back in office can you please review if appropriate to refill

## 2021-06-14 DIAGNOSIS — F33.2 MDD (MAJOR DEPRESSIVE DISORDER), RECURRENT SEVERE, WITHOUT PSYCHOSIS (HCC): ICD-10-CM

## 2021-06-15 RX ORDER — LAMOTRIGINE 100 MG/1
TABLET ORAL
Qty: 60 TABLET | Refills: 0 | Status: SHIPPED | OUTPATIENT
Start: 2021-06-15 | End: 2021-07-13

## 2021-06-29 DIAGNOSIS — F41.1 GENERALIZED ANXIETY DISORDER: ICD-10-CM

## 2021-06-29 RX ORDER — DESVENLAFAXINE 50 MG/1
TABLET, EXTENDED RELEASE ORAL
Qty: 30 TABLET | Refills: 0 | Status: SHIPPED | OUTPATIENT
Start: 2021-06-29 | End: 2021-07-22

## 2021-07-09 ENCOUNTER — TELEPHONE (OUTPATIENT)
Dept: PSYCHIATRY | Facility: CLINIC | Age: 56
End: 2021-07-09

## 2021-07-14 ENCOUNTER — TELEPHONE (OUTPATIENT)
Dept: PSYCHIATRY | Facility: CLINIC | Age: 56
End: 2021-07-14

## 2021-07-14 NOTE — TELEPHONE ENCOUNTER
7/14 @ 9:08  Oralia called inquiring about paperwork from provider to go to [employer]  I printed the paperwork from patient chart and put into providers mail box for completion

## 2021-07-22 ENCOUNTER — TELEMEDICINE (OUTPATIENT)
Dept: PSYCHIATRY | Facility: CLINIC | Age: 56
End: 2021-07-22
Payer: COMMERCIAL

## 2021-07-22 ENCOUNTER — TELEPHONE (OUTPATIENT)
Dept: PSYCHIATRY | Facility: CLINIC | Age: 56
End: 2021-07-22

## 2021-07-22 DIAGNOSIS — F33.2 MDD (MAJOR DEPRESSIVE DISORDER), RECURRENT SEVERE, WITHOUT PSYCHOSIS (HCC): ICD-10-CM

## 2021-07-22 DIAGNOSIS — F41.1 GENERALIZED ANXIETY DISORDER: ICD-10-CM

## 2021-07-22 DIAGNOSIS — F31.4 BIPOLAR DISORDER, CURRENT EPISODE DEPRESSED, SEVERE, WITHOUT PSYCHOTIC FEATURES (HCC): Primary | ICD-10-CM

## 2021-07-22 PROBLEM — M79.672 FOOT PAIN, LEFT: Status: ACTIVE | Noted: 2021-06-11

## 2021-07-22 PROCEDURE — 90833 PSYTX W PT W E/M 30 MIN: CPT | Performed by: PSYCHIATRY & NEUROLOGY

## 2021-07-22 PROCEDURE — 99214 OFFICE O/P EST MOD 30 MIN: CPT | Performed by: PSYCHIATRY & NEUROLOGY

## 2021-07-22 RX ORDER — DESVENLAFAXINE 100 MG/1
100 TABLET, EXTENDED RELEASE ORAL DAILY
Qty: 30 TABLET | Refills: 2 | Status: SHIPPED | OUTPATIENT
Start: 2021-07-22 | End: 2021-10-05

## 2021-07-22 RX ORDER — ACETAMINOPHEN 160 MG
TABLET,DISINTEGRATING ORAL
COMMUNITY
Start: 2021-07-19

## 2021-07-22 RX ORDER — ASPIRIN 325 MG
TABLET, DELAYED RELEASE (ENTERIC COATED) ORAL
COMMUNITY
Start: 2021-06-24

## 2021-07-22 RX ORDER — LAMOTRIGINE 100 MG/1
100 TABLET ORAL 2 TIMES DAILY
Qty: 60 TABLET | Refills: 2 | Status: SHIPPED | OUTPATIENT
Start: 2021-07-22 | End: 2021-10-19

## 2021-07-22 NOTE — LETTER
July 26, 2021     Patient: Julio C Roldan   YOB: 1965   Date of Visit: 7/22/2021       To Whom it May Concern:    Kongtaylor Arreola is under my professional care  She was seen in my office on 7/22/2021  Her current diagnoses are:  Patient Active Problem List   Diagnosis    Bipolar disorder ([facility] [address] )    Generalized anxiety disorder      Her current medications are:    Alprazolam 2 mg tid  Pristiq 100 mg daily   Lamictal 100 mg bid    Crystal is remains compliant with her medications and attends all her follow up appointments  If you have any questions or concerns, please don't hesitate to call           Sincerely,          Rosalee Siu MD        CC: No Recipients
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
## 2021-07-22 NOTE — BH TREATMENT PLAN
TREATMENT PLAN (Medication Management Only)        [facility]    Name and Date of Birth:  Dillon Yarbrough 49 y o  1965  Date of Treatment Plan: July 22, 2021  Diagnosis/Diagnoses:    1  Bipolar disorder, current episode depressed, severe, without psychotic features ([address] )    2  Generalized anxiety disorder      Strengths/Personal Resources for Self-Care: taking medications as prescribed, ability to communicate needs  Area/Areas of need (in own words): mood instability  1  Long Term Goal: continue improvement in mood stability  Target Date:3 months - 10/22/2021  Person/Persons responsible for completion of goal: Oralia  2  Short Term Objective (s) - How will we reach this goal?:   A  Provider new recommended medication/dosage changes and/or continue medication(s): continue current medications as prescribed  B  N/A   C  N/A  Target Date:3 months - 10/22/2021  Person/Persons Responsible for Completion of Goal: Crystal  Progress Towards Goals: continuing treatment  Treatment Modality: medication management every 3 months  Review due 180 days from date of this plan: 6 months - 1/22/2022  Expected length of service: ongoing treatment  My Physician/PA/NP and I have developed this plan together and I agree to work on the goals and objectives  I understand the treatment goals that were developed for my treatment

## 2021-07-22 NOTE — TELEPHONE ENCOUNTER
Received a call from staff at [facility]  She clarified that medical records were not needed for Oralia, rather she needs a letter from Dr Desi Rm verifying and adhering to taking Alprazolam  The letter is to go to       Dr Kristel Christina     [address] [place] [address], [address]     Fax # [phone]    Spoke to [address] [place] [address] and informed her she would need to fill out a Release of Information form  She stated she would come in

## 2021-07-27 ENCOUNTER — TELEPHONE (OUTPATIENT)
Dept: PSYCHIATRY | Facility: CLINIC | Age: 56
End: 2021-07-27

## 2021-09-08 DIAGNOSIS — F33.2 MDD (MAJOR DEPRESSIVE DISORDER), RECURRENT SEVERE, WITHOUT PSYCHOSIS (HCC): ICD-10-CM

## 2021-09-08 RX ORDER — ALPRAZOLAM 2 MG/1
2 TABLET ORAL 3 TIMES DAILY
Qty: 90 TABLET | Refills: 2 | Status: SHIPPED | OUTPATIENT
Start: 2021-09-08 | End: 2021-12-29 | Stop reason: SDUPTHER

## 2021-10-05 DIAGNOSIS — F41.1 GENERALIZED ANXIETY DISORDER: ICD-10-CM

## 2021-10-05 DIAGNOSIS — F31.4 BIPOLAR DISORDER, CURRENT EPISODE DEPRESSED, SEVERE, WITHOUT PSYCHOTIC FEATURES (HCC): ICD-10-CM

## 2021-10-05 RX ORDER — DESVENLAFAXINE 100 MG/1
TABLET, EXTENDED RELEASE ORAL
Qty: 30 TABLET | Refills: 1 | Status: SHIPPED | OUTPATIENT
Start: 2021-10-05 | End: 2021-11-23

## 2021-10-19 DIAGNOSIS — F33.2 MDD (MAJOR DEPRESSIVE DISORDER), RECURRENT SEVERE, WITHOUT PSYCHOSIS (HCC): ICD-10-CM

## 2021-10-19 RX ORDER — LAMOTRIGINE 100 MG/1
TABLET ORAL
Qty: 60 TABLET | Refills: 1 | Status: SHIPPED | OUTPATIENT
Start: 2021-10-19 | End: 2021-12-07

## 2021-10-26 ENCOUNTER — TELEMEDICINE (OUTPATIENT)
Dept: PSYCHIATRY | Facility: CLINIC | Age: 56
End: 2021-10-26
Payer: COMMERCIAL

## 2021-10-26 DIAGNOSIS — F41.1 GENERALIZED ANXIETY DISORDER: ICD-10-CM

## 2021-10-26 DIAGNOSIS — F31.32 BIPOLAR AFFECTIVE DISORDER, CURRENTLY DEPRESSED, MODERATE (HCC): Primary | ICD-10-CM

## 2021-10-26 PROCEDURE — 99213 OFFICE O/P EST LOW 20 MIN: CPT | Performed by: PSYCHIATRY & NEUROLOGY

## 2021-10-26 PROCEDURE — 90833 PSYTX W PT W E/M 30 MIN: CPT | Performed by: PSYCHIATRY & NEUROLOGY

## 2021-12-07 DIAGNOSIS — F33.2 MDD (MAJOR DEPRESSIVE DISORDER), RECURRENT SEVERE, WITHOUT PSYCHOSIS (HCC): ICD-10-CM

## 2021-12-07 RX ORDER — LAMOTRIGINE 100 MG/1
TABLET ORAL
Qty: 60 TABLET | Refills: 0 | Status: SHIPPED | OUTPATIENT
Start: 2021-12-07 | End: 2022-01-04

## 2021-12-29 DIAGNOSIS — F33.2 MDD (MAJOR DEPRESSIVE DISORDER), RECURRENT SEVERE, WITHOUT PSYCHOSIS (HCC): ICD-10-CM

## 2021-12-29 RX ORDER — ALPRAZOLAM 2 MG/1
2 TABLET ORAL 3 TIMES DAILY
Qty: 90 TABLET | Refills: 2 | Status: SHIPPED | OUTPATIENT
Start: 2021-12-29 | End: 2022-04-27 | Stop reason: SDUPTHER

## 2022-03-05 ENCOUNTER — APPOINTMENT (EMERGENCY)
Dept: CT IMAGING | Facility: HOSPITAL | Age: 57
End: 2022-03-05
Payer: COMMERCIAL

## 2022-03-05 ENCOUNTER — HOSPITAL ENCOUNTER (EMERGENCY)
Facility: HOSPITAL | Age: 57
Discharge: HOME | End: 2022-03-05
Attending: EMERGENCY MEDICINE
Payer: COMMERCIAL

## 2022-03-05 VITALS
SYSTOLIC BLOOD PRESSURE: 124 MMHG | RESPIRATION RATE: 16 BRPM | TEMPERATURE: 98.2 F | DIASTOLIC BLOOD PRESSURE: 60 MMHG | OXYGEN SATURATION: 98 % | HEART RATE: 72 BPM

## 2022-03-05 DIAGNOSIS — R42 DIZZINESS: Primary | ICD-10-CM

## 2022-03-05 DIAGNOSIS — R42 VERTIGO: ICD-10-CM

## 2022-03-05 LAB
ALBUMIN SERPL BCP-MCNC: 3.3 G/DL (ref 3.5–5)
ALP SERPL-CCNC: 106 U/L (ref 46–116)
ALT SERPL W P-5'-P-CCNC: 21 U/L (ref 12–78)
ANION GAP SERPL CALCULATED.3IONS-SCNC: 8 MMOL/L (ref 4–13)
APTT PPP: 56 SECONDS (ref 23–37)
AST SERPL W P-5'-P-CCNC: 19 U/L (ref 5–45)
ATRIAL RATE: 84 BPM
BASOPHILS # BLD AUTO: 0.05 THOUSANDS/ΜL (ref 0–0.1)
BASOPHILS NFR BLD AUTO: 1 % (ref 0–1)
BILIRUB SERPL-MCNC: 0.21 MG/DL (ref 0.2–1)
BUN SERPL-MCNC: 16 MG/DL (ref 5–25)
CALCIUM ALBUM COR SERPL-MCNC: 9.1 MG/DL (ref 8.3–10.1)
CALCIUM SERPL-MCNC: 8.5 MG/DL (ref 8.3–10.1)
CHLORIDE SERPL-SCNC: 109 MMOL/L (ref 100–108)
CO2 SERPL-SCNC: 28 MMOL/L (ref 21–32)
CREAT SERPL-MCNC: 1.27 MG/DL (ref 0.6–1.3)
EOSINOPHIL # BLD AUTO: 0.13 THOUSAND/ΜL (ref 0–0.61)
EOSINOPHIL NFR BLD AUTO: 2 % (ref 0–6)
ERYTHROCYTE [DISTWIDTH] IN BLOOD BY AUTOMATED COUNT: 15.3 % (ref 11.6–15.1)
GFR SERPL CREATININE-BSD FRML MDRD: 47 ML/MIN/1.73SQ M
GLUCOSE SERPL-MCNC: 161 MG/DL (ref 65–140)
HCT VFR BLD AUTO: 34.6 % (ref 34.8–46.1)
HGB BLD-MCNC: 10.3 G/DL (ref 11.5–15.4)
IMM GRANULOCYTES # BLD AUTO: 0.01 THOUSAND/UL (ref 0–0.2)
IMM GRANULOCYTES NFR BLD AUTO: 0 % (ref 0–2)
INR PPP: 1.07 (ref 0.84–1.19)
INR PPP: 2.34 (ref 0.84–1.19)
LYMPHOCYTES # BLD AUTO: 2.77 THOUSANDS/ΜL (ref 0.6–4.47)
LYMPHOCYTES NFR BLD AUTO: 52 % (ref 14–44)
MCH RBC QN AUTO: 25.6 PG (ref 26.8–34.3)
MCHC RBC AUTO-ENTMCNC: 29.8 G/DL (ref 31.4–37.4)
MCV RBC AUTO: 86 FL (ref 82–98)
MONOCYTES # BLD AUTO: 0.46 THOUSAND/ΜL (ref 0.17–1.22)
MONOCYTES NFR BLD AUTO: 9 % (ref 4–12)
NEUTROPHILS # BLD AUTO: 1.93 THOUSANDS/ΜL (ref 1.85–7.62)
NEUTS SEG NFR BLD AUTO: 36 % (ref 43–75)
NRBC BLD AUTO-RTO: 0 /100 WBCS
P AXIS: 74 DEGREES
PLATELET # BLD AUTO: 237 THOUSANDS/UL (ref 149–390)
PMV BLD AUTO: 9.6 FL (ref 8.9–12.7)
POTASSIUM SERPL-SCNC: 3.9 MMOL/L (ref 3.5–5.3)
PR INTERVAL: 160 MS
PROT SERPL-MCNC: 7 G/DL (ref 6.4–8.2)
PROTHROMBIN TIME: 13.6 SECONDS (ref 11.6–14.5)
PROTHROMBIN TIME: 24.7 SECONDS (ref 11.6–14.5)
QRS AXIS: 89 DEGREES
QRSD INTERVAL: 86 MS
QT INTERVAL: 352 MS
QTC INTERVAL: 415 MS
RBC # BLD AUTO: 4.03 MILLION/UL (ref 3.81–5.12)
SODIUM SERPL-SCNC: 145 MMOL/L (ref 136–145)
T WAVE AXIS: 0 DEGREES
VENTRICULAR RATE: 84 BPM
WBC # BLD AUTO: 5.35 THOUSAND/UL (ref 4.31–10.16)

## 2022-03-05 PROCEDURE — 99284 EMERGENCY DEPT VISIT MOD MDM: CPT | Performed by: EMERGENCY MEDICINE

## 2022-03-05 PROCEDURE — 93010 ELECTROCARDIOGRAM REPORT: CPT | Performed by: INTERNAL MEDICINE

## 2022-03-05 PROCEDURE — 85730 THROMBOPLASTIN TIME PARTIAL: CPT | Performed by: EMERGENCY MEDICINE

## 2022-03-05 PROCEDURE — 85610 PROTHROMBIN TIME: CPT | Performed by: EMERGENCY MEDICINE

## 2022-03-05 PROCEDURE — 96374 THER/PROPH/DIAG INJ IV PUSH: CPT

## 2022-03-05 PROCEDURE — 80053 COMPREHEN METABOLIC PANEL: CPT | Performed by: EMERGENCY MEDICINE

## 2022-03-05 PROCEDURE — 70496 CT ANGIOGRAPHY HEAD: CPT

## 2022-03-05 PROCEDURE — 99285 EMERGENCY DEPT VISIT HI MDM: CPT

## 2022-03-05 PROCEDURE — 93005 ELECTROCARDIOGRAM TRACING: CPT

## 2022-03-05 PROCEDURE — 85025 COMPLETE CBC W/AUTO DIFF WBC: CPT | Performed by: EMERGENCY MEDICINE

## 2022-03-05 PROCEDURE — 36415 COLL VENOUS BLD VENIPUNCTURE: CPT | Performed by: EMERGENCY MEDICINE

## 2022-03-05 PROCEDURE — 70498 CT ANGIOGRAPHY NECK: CPT

## 2022-03-05 PROCEDURE — 96361 HYDRATE IV INFUSION ADD-ON: CPT

## 2022-03-05 RX ORDER — MECLIZINE HCL 12.5 MG/1
25 TABLET ORAL ONCE
Status: COMPLETED | OUTPATIENT
Start: 2022-03-05 | End: 2022-03-05

## 2022-03-05 RX ORDER — ONDANSETRON 4 MG/1
4 TABLET, ORALLY DISINTEGRATING ORAL EVERY 8 HOURS PRN
Qty: 10 TABLET | Refills: 0 | Status: SHIPPED | OUTPATIENT
Start: 2022-03-05 | End: 2022-03-10

## 2022-03-05 RX ORDER — DROPERIDOL 2.5 MG/ML
1.25 INJECTION, SOLUTION INTRAMUSCULAR; INTRAVENOUS EVERY 6 HOURS PRN
Status: DISCONTINUED | OUTPATIENT
Start: 2022-03-05 | End: 2022-03-05 | Stop reason: RX

## 2022-03-05 RX ORDER — ONDANSETRON 2 MG/ML
4 INJECTION INTRAMUSCULAR; INTRAVENOUS ONCE
Status: COMPLETED | OUTPATIENT
Start: 2022-03-05 | End: 2022-03-05

## 2022-03-05 RX ORDER — MECLIZINE HYDROCHLORIDE 25 MG/1
25 TABLET ORAL 3 TIMES DAILY PRN
Qty: 10 TABLET | Refills: 0 | Status: SHIPPED | OUTPATIENT
Start: 2022-03-05

## 2022-03-05 RX ADMIN — SODIUM CHLORIDE 500 ML: 0.9 INJECTION, SOLUTION INTRAVENOUS at 08:27

## 2022-03-05 RX ADMIN — MECLIZINE 25 MG: 12.5 TABLET ORAL at 10:40

## 2022-03-05 RX ADMIN — IOHEXOL 85 ML: 350 INJECTION, SOLUTION INTRAVENOUS at 09:19

## 2022-03-05 RX ADMIN — ONDANSETRON 4 MG: 2 INJECTION INTRAMUSCULAR; INTRAVENOUS at 08:33

## 2022-03-05 NOTE — ED PROVIDER NOTES
History  Chief Complaint   Patient presents with    Dizziness     tomasaetn woke up at baseline this morning  bend over to pick something up and felt a pop in right ear  paitetn started with dizziness and vomiting  SOB but denies CP       History provided by:  Patient   used: No    Dizziness  Quality:  Vertigo  Severity:  Moderate  Onset quality:  Sudden  Duration:  1 hour  Timing:  Intermittent  Progression:  Waxing and waning  Chronicity:  New  Context: bending over    Relieved by:  Nothing  Worsened by:  Nothing  Ineffective treatments:  None tried  Associated symptoms: no chest pain, no diarrhea, no headaches, no nausea, no shortness of breath, no syncope, no vision changes, no vomiting and no weakness    Risk factors: hx of stroke        Prior to Admission Medications   Prescriptions Last Dose Informant Patient Reported? Taking?    ALPRAZolam (XANAX) 2 MG tablet   No No   Sig: Take 1 tablet (2 mg total) by mouth 3 (three) times a day   Albuterol Sulfate 108 (90 Base) MCG/ACT AEPB   Yes No   Sig: Inhale 2 puffs every 4 (four) hours as needed   Buprenorphine HCl-Naloxone HCl 4-1 MG FILM   Yes No   Cholecalciferol (Vitamin D3) 50 MCG (2000 UT) capsule   Yes No   Cholecalciferol 50 MCG (2000 UT) CAPS   Yes No   Sig: Take 2 capsules by mouth daily   Multiple Vitamin (Tab-A-Herman/Beta Carotene) TABS   Yes No   Sig: TAKE 1 TABLET BY MOUTH DAILY   Multiple Vitamins-Minerals (MULTIVITAMIN ADULT PO)   Yes No   Sig: Take 1 tablet by mouth daily   Multiple Vitamins-Minerals (MULTIVITAMIN ADULT, MINERALS, PO)   Yes No   Sig: Take 1 tablet by mouth daily   Narcan 4 MG/0 1ML nasal spray   Yes No   TiZANidine (ZANAFLEX) 4 MG capsule   Yes No   Sig: Take 4 mg by mouth Three times daily as needed   albuterol (ACCUNEB) 1 25 MG/3ML nebulizer solution   Yes No   Sig: Inhale 1 25 mg every 6 (six) hours as needed   aspirin (ASPIRIN ADULT) 325 mg tablet   Yes No   Sig: Take 325 mg by mouth daily   aspirin (ECOTRIN) 325 mg EC tablet   Yes No   budesonide-formoterol (SYMBICORT) 160-4 5 mcg/act inhaler   Yes No   Sig: INHALE 2 PUFFS 2 (TWO) TIMES A DAY  RINSE MOUTH AFTER USE    buprenorphine-naloxone (SUBOXONE) 2-0 5 mg   Yes No   buprenorphine-naloxone (SUBOXONE) 8-2 mg   Yes No   cyanocobalamin 500 MCG tablet   Yes No   Sig: Take 500 mcg by mouth daily   desvenlafaxine (PRISTIQ) 100 mg 24 hr tablet   No No   Sig: TAKE ONE TABLET BY MOUTH ONCE DAILY   ergocalciferol (VITAMIN D2) 50,000 units   Yes No   famotidine (PEPCID) 20 mg tablet   Yes No   Sig: Take 20 mg by mouth 2 (two) times a day   ferrous sulfate 325 (65 Fe) mg tablet   Yes No   Sig: Take 325 mg by mouth   fluticasone (FLONASE) 50 mcg/act nasal spray   Yes No   Si sprays into each nostril daily   lamoTRIgine (LaMICtal) 100 mg tablet   No No   Sig: TAKE ONE TABLET BY MOUTH TWICE A DAY   lisinopril (ZESTRIL) 5 mg tablet   Yes No   Sig: Take 2 5 mg by mouth daily   methocarbamol (ROBAXIN) 750 mg tablet   Yes No   Sig: Take by mouth   omeprazole (PriLOSEC) 20 mg delayed release capsule   Yes No   Sig: One capsule po daily   ondansetron (ZOFRAN-ODT) 4 mg disintegrating tablet   Yes No   Sig: Take by mouth   pantoprazole (PROTONIX) 40 mg tablet   Yes No   Sig: Take 40 mg by mouth daily   ranitidine (ZANTAC) 150 MG capsule   Yes No   Sig: Take 150 mg by mouth   senna-docusate sodium (SENOKOT S) 8 6-50 mg per tablet   Yes No   Sig: TAKE ONE TABLET BY MOUTH TWICE A DAY AS NEEDED FOR CONSTIPATION   simvastatin (ZOCOR) 20 mg tablet   Yes No   Sig: Take 20 mg by mouth   vitamin B-12 (VITAMIN B-12) 500 mcg tablet   Yes No   Sig: TAKE 1 TABLET (500 MCG TOTAL) BY MOUTH DAILY  Facility-Administered Medications: None       Past Medical History:   Diagnosis Date    Hyperlipidemia     Hypertension        Past Surgical History:   Procedure Laterality Date    CHOLECYSTECTOMY      GASTRIC BYPASS         History reviewed  No pertinent family history    I have reviewed and agree with the history as documented  E-Cigarette/Vaping     E-Cigarette/Vaping Substances     Social History     Tobacco Use    Smoking status: Former Smoker    Smokeless tobacco: Never Used   Substance Use Topics    Alcohol use: Not Currently    Drug use: Not on file       Review of Systems   Constitutional: Negative for chills and fever  HENT: Negative for facial swelling, sore throat and trouble swallowing  Eyes: Negative for pain and visual disturbance  Respiratory: Negative for cough and shortness of breath  Cardiovascular: Negative for chest pain, leg swelling and syncope  Gastrointestinal: Negative for abdominal pain, diarrhea, nausea and vomiting  Genitourinary: Negative for dysuria and flank pain  Musculoskeletal: Negative for back pain, neck pain and neck stiffness  Skin: Negative for pallor and rash  Allergic/Immunologic: Negative for environmental allergies and immunocompromised state  Neurological: Positive for dizziness  Negative for weakness and headaches  Hematological: Negative for adenopathy  Does not bruise/bleed easily  Psychiatric/Behavioral: Negative for agitation and behavioral problems  All other systems reviewed and are negative  Physical Exam  Physical Exam  Vitals and nursing note reviewed  Constitutional:       General: She is not in acute distress  Appearance: She is well-developed  HENT:      Head: Normocephalic and atraumatic  Eyes:      Extraocular Movements: Extraocular movements intact  Pupils: Pupils are equal, round, and reactive to light  Cardiovascular:      Rate and Rhythm: Normal rate and regular rhythm  Pulmonary:      Effort: Pulmonary effort is normal  No respiratory distress  Abdominal:      Palpations: Abdomen is soft  Tenderness: There is no abdominal tenderness  There is no guarding or rebound  Musculoskeletal:         General: Normal range of motion        Cervical back: Normal range of motion and neck supple  Skin:     General: Skin is warm and dry  Neurological:      General: No focal deficit present  Mental Status: She is alert and oriented to person, place, and time  Cranial Nerves: No cranial nerve deficit  Sensory: No sensory deficit  Motor: No weakness        Coordination: Coordination normal    Psychiatric:         Mood and Affect: Mood normal          Behavior: Behavior normal          Vital Signs  ED Triage Vitals [03/05/22 0806]   Temperature Pulse Respirations Blood Pressure SpO2   98 2 °F (36 8 °C) 91 18 163/80 97 %      Temp Source Heart Rate Source Patient Position - Orthostatic VS BP Location FiO2 (%)   Oral -- -- -- --      Pain Score       --           Vitals:    03/05/22 0806 03/05/22 0930 03/05/22 1015   BP: 163/80  124/60   Pulse: 91 82 72         Visual Acuity  Visual Acuity      Most Recent Value   L Pupil Size (mm) 3   R Pupil Size (mm) 3          ED Medications  Medications   ondansetron (ZOFRAN) injection 4 mg (4 mg Intravenous Given 3/5/22 0833)   sodium chloride 0 9 % bolus 500 mL (0 mL Intravenous Stopped 3/5/22 0930)   meclizine (ANTIVERT) tablet 25 mg (25 mg Oral Given 3/5/22 1040)   iohexol (OMNIPAQUE) 350 MG/ML injection (SINGLE-DOSE) 85 mL (85 mL Intravenous Given 3/5/22 0919)       Diagnostic Studies  Results Reviewed     Procedure Component Value Units Date/Time    Protime-INR [[id number]]  (Normal) Collected: 03/05/22 0929    Lab Status: Final result Specimen: Blood from Arm, Left Updated: 03/05/22 1010     Protime 13 6 seconds      INR 1 07    Protime-INR [[id number]]  (Abnormal) Collected: 03/05/22 0841    Lab Status: Final result Specimen: Blood from Arm, Left Updated: 03/05/22 0913     Protime 24 7 seconds      INR 2 34    APTT [[id number]]  (Abnormal) Collected: 03/05/22 0841    Lab Status: Final result Specimen: Blood from Arm, Left Updated: 03/05/22 0913     PTT 56 seconds     Comprehensive metabolic panel [[id number]]  (Abnormal) Collected: 03/05/22 7661    Lab Status: Final result Specimen: Blood from Arm, Left Updated: 03/05/22 0857     Sodium 145 mmol/L      Potassium 3 9 mmol/L      Chloride 109 mmol/L      CO2 28 mmol/L      ANION GAP 8 mmol/L      BUN 16 mg/dL      Creatinine 1 27 mg/dL      Glucose 161 mg/dL      Calcium 8 5 mg/dL      Corrected Calcium 9 1 mg/dL      AST 19 U/L      ALT 21 U/L      Alkaline Phosphatase 106 U/L      Total Protein 7 0 g/dL      Albumin 3 3 g/dL      Total Bilirubin 0 21 mg/dL      eGFR 47 ml/min/1 73sq m     Narrative:      [facility] guidelines for Chronic Kidney Disease (CKD):     Stage 1 with normal or high GFR (GFR > 90 mL/min/1 73 square meters)    Stage 2 Mild CKD (GFR = 60-89 mL/min/1 73 square meters)    Stage 3A Moderate CKD (GFR = 45-59 mL/min/1 73 square meters)    Stage 3B Moderate CKD (GFR = 30-44 mL/min/1 73 square meters)    Stage 4 Severe CKD (GFR = 15-29 mL/min/1 73 square meters)    Stage 5 End Stage CKD (GFR <15 mL/min/1 73 square meters)  Note: GFR calculation is accurate only with a steady state creatinine    CBC and differential [[id number]]  (Abnormal) Collected: 03/05/22 0826    Lab Status: Final result Specimen: Blood from Arm, Left Updated: 03/05/22 0833     WBC 5 35 Thousand/uL      RBC 4 03 Million/uL      Hemoglobin 10 3 g/dL      Hematocrit 34 6 %      MCV 86 fL      MCH 25 6 pg      MCHC 29 8 g/dL      RDW 15 3 %      MPV 9 6 fL      Platelets 734 Thousands/uL      nRBC 0 /100 WBCs      Neutrophils Relative 36 %      Immat GRANS % 0 %      Lymphocytes Relative 52 %      Monocytes Relative 9 %      Eosinophils Relative 2 %      Basophils Relative 1 %      Neutrophils Absolute 1 93 Thousands/µL      Immature Grans Absolute 0 01 Thousand/uL      Lymphocytes Absolute 2 77 Thousands/µL      Monocytes Absolute 0 46 Thousand/µL      Eosinophils Absolute 0 13 Thousand/µL      Basophils Absolute 0 05 Thousands/µL                  CTA head and neck with and without contrast   Final Result by Anastacio Molina MD (03/05 1000)      No acute intracranial pathology  No significant stenosis of the cervical carotid or vertebral arteries  No significant intracranial stenosis, large vessel occlusion or aneurysm  Workstation performed: [id number]                    Procedures  ECG 12 Lead Documentation Only    Date/Time: 3/5/2022 8:12 AM  Performed by: Stacy Taylor MD  Authorized by: Stacy Taylor MD     Indications / Diagnosis:  Dizziness  ECG reviewed by me, the ED Provider: yes    Patient location:  ED  Interpretation:     Interpretation: normal    Rate:     ECG rate:  84    ECG rate assessment: normal    Rhythm:     Rhythm: sinus rhythm    Ectopy:     Ectopy: none    QRS:     QRS axis:  Normal  Conduction:     Conduction: normal    ST segments:     ST segments:  Normal  T waves:     T waves: normal               ED Course  ED Course as of 03/05/22 1224   Sat Mar 05, 2022   0901 WBC: 5 35   0901 Hemoglobin(!): 10 3   0901 Platelet Count: 692   0901 Sodium: 145   0901 Potassium: 3 9   0901 BUN: 16   0901 Creatinine: 1 27   0901 Glucose, Random(!): 161  Labs reviewed  2065 POCT INR(!): 2 34  Patient not on AC, LFTs normal, no documented liver disease  We will repeat INR  1020 POCT INR: 1 07  Repeat INR normal    1020 CTA head results reviewed, no acute abnormality  1021 Patient evaluated, feeling better, will discharge on meclizine for possible vertigo                    Stroke Assessment     Row Name 03/05/22 2024             NIH Stroke Scale    Interval Baseline      Level of Consciousness (1a ) 0      LOC Questions (1b ) 0      LOC Commands (1c ) 0      Best Gaze (2 ) 0      Visual (3 ) 0      Facial Palsy (4 ) 0      Motor Arm, Left (5a ) 0      Motor Arm, Right (5b ) 0      Motor Leg, Left (6a ) 0      Motor Leg, Right (6b ) 0      Limb Ataxia (7 ) 0      Sensory (8 ) 0      Best Language (9 ) 0      Dysarthria (10 ) 0      Extinction and Inattention (11 ) (Formerly Neglect) 0      Total 0                            SBIRT 22yo+      Most Recent Value   SBIRT (25 yo +)    In order to provide better care to our patients, we are screening all of our patients for alcohol and drug use  Would it be okay to ask you these screening questions? Yes Filed at: 03/05/2022 1589   Initial Alcohol Screen: US AUDIT-C     1  How often do you have a drink containing alcohol? 0 Filed at: 03/05/2022 0905   2  How many drinks containing alcohol do you have on a typical day you are drinking? 0 Filed at: 03/05/2022 0905   3a  Male UNDER 65: How often do you have five or more drinks on one occasion? 0 Filed at: 03/05/2022 0905   3b  FEMALE Any Age, or MALE 65+: How often do you have 4 or more drinks on one occassion? 0 Filed at: 03/05/2022 0905   Audit-C Score 0 Filed at: 03/05/2022 2980   DAPHNE: How many times in the past year have you    Used an illegal drug or used a prescription medication for non-medical reasons? Never Filed at: 03/05/2022 0905                    MDM  Number of Diagnoses or Management Options  Dizziness: new and requires workup  Vertigo  Diagnosis management comments: Patient is a 50 yo female, history of prior stroke, aspirin 325, comes in with complaints of dizziness, patient states that she bent down to get something, 4 of them felt the room spinning, patient denies headache, fever, neck pain, chest pain, dyspnea, abdominal back pain  On exam, patient is conscious, alert, appears nauseous, vital signs are stable, pupils are equal round and reactive to light, no cranial nerve or focal neuro deficits, normal coordination, finger-to-nose and heel-to-shin test, motor/sensory exam intact bilaterally  Differential diagnosis:  Vertigo, possible stroke due to patient's prior history, will check labs, get CT head and neck, give meclizine, IV fluid bolus         Amount and/or Complexity of Data Reviewed  Clinical lab tests: ordered and reviewed  Tests in the radiology section of CPT®: ordered and reviewed  Tests in the medicine section of CPT®: reviewed and ordered  Independent visualization of images, tracings, or specimens: yes        Disposition  Final diagnoses:   Dizziness   Vertigo     Time reflects when diagnosis was documented in both MDM as applicable and the Disposition within this note     Time User Action Codes Description Comment    3/5/2022  8:13 AM Raul Matters Add [R42] Dizziness     3/5/2022 10:22 AM Raul Matters Add [R42] Vertigo       ED Disposition     ED Disposition Condition Date/Time Comment    Discharge Stable Sat Mar 5, 2022 10:22 AM Oralia Ambrocio discharge to home/self care              Follow-up Information     Follow up With Specialties Details Why Contact Info Additional [facility] Schedule an appointment as soon as possible for a visit   [address], [address] [phone]  [address] [place] [address], [address], [address] [facility], [address]          Discharge Medication List as of 3/5/2022 10:22 AM      START taking these medications    Details   meclizine (ANTIVERT) 25 mg tablet Take 1 tablet (25 mg total) by mouth 3 (three) times a day as needed for dizziness, Starting Sat 3/5/2022, Print         CONTINUE these medications which have NOT CHANGED    Details   albuterol (ACCUNEB) 1 25 MG/3ML nebulizer solution Inhale 1 25 mg every 6 (six) hours as needed, Starting Wed 8/9/2017, Historical Med      Albuterol Sulfate 108 (90 Base) MCG/ACT AEPB Inhale 2 puffs every 4 (four) hours as needed, Starting Wed 8/9/2017, Historical Med      ALPRAZolam (XANAX) 2 MG tablet Take 1 tablet (2 mg total) by mouth 3 (three) times a day, Starting Wed 12/29/2021, Normal      aspirin (ASPIRIN ADULT) 325 mg tablet Take 325 mg by mouth daily, Starting Fri 7/12/2019, Historical Med      aspirin (ECOTRIN) 325 mg EC tablet Starting u 6/24/2021, Historical Med      budesonide-formoterol (SYMBICORT) 160-4 5 mcg/act inhaler INHALE 2 PUFFS 2 (TWO) TIMES A DAY  RINSE MOUTH AFTER USE , Historical Med      Buprenorphine HCl-Naloxone HCl 4-1 MG FILM Starting Mon 7/27/2020, Historical Med      buprenorphine-naloxone (SUBOXONE) 2-0 5 mg Starting Mon 10/19/2020, Historical Med      buprenorphine-naloxone (SUBOXONE) 8-2 mg Starting Mon 10/19/2020, Historical Med      !! Cholecalciferol (Vitamin D3) 50 MCG (2000 UT) capsule Starting Mon 7/19/2021, Historical Med      !!  Cholecalciferol 50 MCG (2000 UT) CAPS Take 2 capsules by mouth daily, Starting Fri 4/9/2021, Until Sat 4/9/2022, Historical Med      !! cyanocobalamin 500 MCG tablet Take 500 mcg by mouth daily, Starting Fri 7/12/2019, Historical Med      desvenlafaxine (PRISTIQ) 100 mg 24 hr tablet TAKE ONE TABLET BY MOUTH ONCE DAILY, Normal      ergocalciferol (VITAMIN D2) 50,000 units Starting Mon 10/26/2020, Historical Med      famotidine (PEPCID) 20 mg tablet Take 20 mg by mouth 2 (two) times a day, Starting Mon 4/27/2020, Until Tue 4/27/2021, Historical Med      ferrous sulfate 325 (65 Fe) mg tablet Take 325 mg by mouth, Starting Fri 7/12/2019, Historical Med      fluticasone (FLONASE) 50 mcg/act nasal spray 2 sprays into each nostril daily, Starting Sat 6/27/2015, Historical Med      lamoTRIgine (LaMICtal) 100 mg tablet TAKE ONE TABLET BY MOUTH TWICE A DAY, Normal      lisinopril (ZESTRIL) 5 mg tablet Take 2 5 mg by mouth daily, Starting Fri 7/12/2019, Historical Med      methocarbamol (ROBAXIN) 750 mg tablet Take by mouth, Starting Tue 8/5/2014, Historical Med      !! Multiple Vitamin (Tab-A-Herman/Beta Carotene) TABS TAKE 1 TABLET BY MOUTH DAILY, Historical Med      !! Multiple Vitamins-Minerals (MULTIVITAMIN ADULT PO) Take 1 tablet by mouth daily, Starting Fri 7/12/2019, Historical Med      Multiple Vitamins-Minerals (MULTIVITAMIN ADULT, MINERALS, PO) Take 1 tablet by mouth daily, Starting Wed 6/23/2021, Historical Med      Narcan 4 MG/0 1ML nasal spray Historical Med      omeprazole (PriLOSEC) 20 mg delayed release capsule One capsule po daily, Historical Med      ondansetron (ZOFRAN-ODT) 4 mg disintegrating tablet Take by mouth, Starting Sat 7/19/2014, Historical Med      pantoprazole (PROTONIX) 40 mg tablet Take 40 mg by mouth daily, Starting Fri 7/3/2020, Until Sat 7/3/2021, Historical Med      ranitidine (ZANTAC) 150 MG capsule Take 150 mg by mouth, Starting Mon 7/6/2015, Historical Med      senna-docusate sodium (SENOKOT S) 8 6-50 mg per tablet TAKE ONE TABLET BY MOUTH TWICE A DAY AS NEEDED FOR CONSTIPATION, Historical Med      simvastatin (ZOCOR) 20 mg tablet Take 20 mg by mouth, Starting Mon 7/21/2014, Historical Med      TiZANidine (ZANAFLEX) 4 MG capsule Take 4 mg by mouth Three times daily as needed, Starting Mon 2/22/2021, Historical Med      !! vitamin B-12 (VITAMIN B-12) 500 mcg tablet TAKE 1 TABLET (500 MCG TOTAL) BY MOUTH DAILY  , Historical Med       !! - Potential duplicate medications found  Please discuss with provider  No discharge procedures on file      PDMP Review       Value Time User    PDMP Reviewed  Yes 5/24/2021 12:16 PM Radha Delarosa MD          ED Provider  Electronically Signed by           Zackary Pack MD  03/05/22 [id number]

## 2022-04-19 DIAGNOSIS — F41.1 GENERALIZED ANXIETY DISORDER: ICD-10-CM

## 2022-04-19 DIAGNOSIS — F31.4 BIPOLAR DISORDER, CURRENT EPISODE DEPRESSED, SEVERE, WITHOUT PSYCHOTIC FEATURES (HCC): ICD-10-CM

## 2022-04-19 RX ORDER — DESVENLAFAXINE 100 MG/1
TABLET, EXTENDED RELEASE ORAL
Qty: 30 TABLET | Refills: 4 | Status: SHIPPED | OUTPATIENT
Start: 2022-04-19

## 2022-04-27 ENCOUNTER — TELEMEDICINE (OUTPATIENT)
Dept: PSYCHIATRY | Facility: CLINIC | Age: 57
End: 2022-04-27
Payer: COMMERCIAL

## 2022-04-27 DIAGNOSIS — F31.76 BIPOLAR DISORDER, IN FULL REMISSION, MOST RECENT EPISODE DEPRESSED (HCC): Primary | ICD-10-CM

## 2022-04-27 DIAGNOSIS — F33.2 MDD (MAJOR DEPRESSIVE DISORDER), RECURRENT SEVERE, WITHOUT PSYCHOSIS (HCC): ICD-10-CM

## 2022-04-27 DIAGNOSIS — F41.1 GENERALIZED ANXIETY DISORDER: ICD-10-CM

## 2022-04-27 PROBLEM — Z98.84 HISTORY OF ROUX-EN-Y GASTRIC BYPASS: Status: ACTIVE | Noted: 2017-08-09

## 2022-04-27 PROCEDURE — 99213 OFFICE O/P EST LOW 20 MIN: CPT | Performed by: PSYCHIATRY & NEUROLOGY

## 2022-04-27 RX ORDER — MAG HYDROX/ALUMINUM HYD/SIMETH 400-400-40
SUSPENSION, ORAL (FINAL DOSE FORM) ORAL
COMMUNITY
Start: 2022-04-12

## 2022-04-27 RX ORDER — TIZANIDINE 4 MG/1
TABLET ORAL
COMMUNITY
Start: 2022-04-20

## 2022-04-27 RX ORDER — ALPRAZOLAM 2 MG/1
2 TABLET ORAL 3 TIMES DAILY
Qty: 90 TABLET | Refills: 2 | Status: SHIPPED | OUTPATIENT
Start: 2022-04-27

## 2022-04-27 RX ORDER — ADHESIVE BANDAGE 3/4"
BANDAGE TOPICAL
COMMUNITY
Start: 2022-04-06
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
## 2022-04-27 NOTE — BH TREATMENT PLAN
TREATMENT PLAN (Medication Management Only)        [facility]    Name and Date of Birth:  Michelle Washington 56 y o  1965  Date of Treatment Plan: April 27, 2022  Diagnosis/Diagnoses:    1  Bipolar disorder, in full remission, most recent episode depressed ([facility] [address] )    2  Generalized anxiety disorder    3  MDD (major depressive disorder), recurrent severe, without psychosis ([facility] )      Strengths/Personal Resources for Self-Care: taking medications as prescribed, ability to communicate needs  Area/Areas of need (in own words): depression, depressive symptoms  1  Long Term Goal: continue improvement in depression  Target Date:6 months - 10/27/2022  Person/Persons responsible for completion of goal: Oralia  2  Short Term Objective (s) - How will we reach this goal?:   A  Provider new recommended medication/dosage changes and/or continue medication(s): continue current medications as prescribed  B  N/A   C  N/A  Target Date:6 months - 10/27/2022  Person/Persons Responsible for Completion of Goal: Crystal  Progress Towards Goals: continuing treatment  Treatment Modality: medication management every 6 months  Review due 180 days from date of this plan: 6 months - 10/27/2022  Expected length of service: ongoing treatment  My Physician/PA/NP and I have developed this plan together and I agree to work on the goals and objectives  I understand the treatment goals that were developed for my treatment

## 2022-06-07 DIAGNOSIS — F33.2 MDD (MAJOR DEPRESSIVE DISORDER), RECURRENT SEVERE, WITHOUT PSYCHOSIS (HCC): ICD-10-CM

## 2022-06-08 RX ORDER — LAMOTRIGINE 100 MG/1
TABLET ORAL
Qty: 60 TABLET | Refills: 1 | Status: SHIPPED | OUTPATIENT
Start: 2022-06-08

## 2022-10-28 ENCOUNTER — TELEMEDICINE (OUTPATIENT)
Dept: PSYCHIATRY | Facility: CLINIC | Age: 57
End: 2022-10-28

## 2022-10-28 DIAGNOSIS — F33.2 MDD (MAJOR DEPRESSIVE DISORDER), RECURRENT SEVERE, WITHOUT PSYCHOSIS (HCC): ICD-10-CM

## 2022-10-28 DIAGNOSIS — F31.32 BIPOLAR AFFECTIVE DISORDER, CURRENTLY DEPRESSED, MODERATE (HCC): Primary | ICD-10-CM

## 2022-10-28 DIAGNOSIS — F31.4 BIPOLAR DISORDER, CURRENT EPISODE DEPRESSED, SEVERE, WITHOUT PSYCHOTIC FEATURES (HCC): ICD-10-CM

## 2022-10-28 DIAGNOSIS — F41.1 GENERALIZED ANXIETY DISORDER: ICD-10-CM

## 2022-10-28 RX ORDER — DESVENLAFAXINE 100 MG/1
100 TABLET, EXTENDED RELEASE ORAL DAILY
Qty: 90 TABLET | Refills: 1 | Status: SHIPPED | OUTPATIENT
Start: 2022-10-28 | End: 2022-10-28 | Stop reason: SDUPTHER

## 2022-10-28 RX ORDER — DESVENLAFAXINE 100 MG/1
100 TABLET, EXTENDED RELEASE ORAL DAILY
Qty: 90 TABLET | Refills: 1 | Status: SHIPPED | OUTPATIENT
Start: 2022-10-28

## 2022-10-28 RX ORDER — LAMOTRIGINE 100 MG/1
100 TABLET ORAL 2 TIMES DAILY
Qty: 180 TABLET | Refills: 1 | Status: SHIPPED | OUTPATIENT
Start: 2022-10-28

## 2022-10-28 RX ORDER — LAMOTRIGINE 100 MG/1
100 TABLET ORAL 2 TIMES DAILY
Qty: 180 TABLET | Refills: 1 | Status: SHIPPED | OUTPATIENT
Start: 2022-10-28 | End: 2022-10-28 | Stop reason: SDUPTHER

## 2022-10-28 RX ORDER — SENNOSIDES/DOCUSATE SODIUM 8.6MG-50MG
TABLET ORAL
COMMUNITY
Start: 2022-09-30

## 2022-10-28 RX ORDER — ALPRAZOLAM 2 MG/1
2 TABLET ORAL 3 TIMES DAILY
Qty: 90 TABLET | Refills: 2 | Status: SHIPPED | OUTPATIENT
Start: 2022-10-28

## 2022-10-28 NOTE — BH TREATMENT PLAN
TREATMENT PLAN (Medication Management Only)        [facility]    Name and Date of Birth:  Radha Ruiz 56 y o  1965  Date of Treatment Plan: October 28, 2022  Diagnosis/Diagnoses:    1  Bipolar affective disorder, currently depressed, moderate ([address] )    2  Generalized anxiety disorder      Strengths/Personal Resources for Self-Care: taking medications as prescribed, ability to communicate needs  Area/Areas of need (in own words): anxiety, anxiety symptoms, depression, depressive symptoms, mood instability  1  Long Term Goal: continue improvement in depression  Target Date:6 months - 4/28/2023  Person/Persons responsible for completion of goal: Oralia  2  Short Term Objective (s) - How will we reach this goal?:   A  Provider new recommended medication/dosage changes and/or continue medication(s): continue current medications as prescribed  B  N/A   C  N/A  Target Date:6 months - 4/28/2023  Person/Persons Responsible for Completion of Goal: Crystal  Progress Towards Goals: continuing treatment  Treatment Modality: medication management every 6 months  Review due 180 days from date of this plan: 6 months - 4/28/2023  Expected length of service: ongoing treatment  My Physician/PA/NP and I have developed this plan together and I agree to work on the goals and objectives  I understand the treatment goals that were developed for my treatment

## 2022-10-28 NOTE — PSYCH
Virtual Regular Visit    Verification of patient location:    Patient is located in the following state in which I hold an active license [place]      Assessment/Plan:    Problem List Items Addressed This Visit        Other    Bipolar disorder ([facility] ) - Primary    Relevant Medications    ALPRAZolam (XANAX) 2 MG tablet    desvenlafaxine (PRISTIQ) 100 mg 24 hr tablet    lamoTRIgine (LaMICtal) 100 mg tablet    Generalized anxiety disorder    Relevant Medications    ALPRAZolam (XANAX) 2 MG tablet    desvenlafaxine (PRISTIQ) 100 mg 24 hr tablet      Other Visit Diagnoses     MDD (major depressive disorder), recurrent severe, without psychosis ([facility] )        Relevant Medications    ALPRAZolam (XANAX) 2 MG tablet    desvenlafaxine (PRISTIQ) 100 mg 24 hr tablet                   Reason for visit is   Chief Complaint   Patient presents with   • Virtual Regular Visit        Encounter provider Fariha Bell MD    Provider located at [address] [address] [phone]      Recent Visits  No visits were found meeting these conditions  Showing recent visits within past 7 days and meeting all other requirements  Today's Visits  Date Type Provider Dept   10/28/22 MD Oliverio Barton 20 today's visits and meeting all other requirements  Future Appointments  No visits were found meeting these conditions  Showing future appointments within next 150 days and meeting all other requirements       The patient was identified by name and date of birth  Oralia Ambrocio was informed that this is a telemedicine visit and [facility] the [facility]  She agrees to proceed     My office door was closed  No one else was in the room  She acknowledged consent and understanding of privacy and security of the video platform   The patient has agreed to participate and understands they can discontinue the visit at any time  Patient is aware this is a billable service  Subjective  Tato Muro is a 56 y o  female with Bipolar do and KENDALL   She remains compliant with her medications and denies side effects, Since last seen she responded well to dose increase on Pristiq to 100 mg daily  She continues Lamotrigine and Alprazolam as well      She is off pain killers and she completely tapered off  Suboxone  She stated since last seen she she lost her sister to DM complications, last July  Also she stated she had been dealing with knee pain and she tried injections but they have not worked and she has swelling and difficulties walking  She stated due to the amount of pain and the limitation on her daily life she is pursuing bilateral knee replacement  She is scheduled for the first surgery in December and the second one in January  She has her 20 y/o daughter at home helping her  She is anxious because her surgery will be very close to [place] but she is trying to keep positive attitude and plans to make all the shopping and decorations prior to her surgery  Will continue other medications as prescribed  Will schedule follow up in 6 months or sooner if needed      HPI     Past Medical History:   Diagnosis Date   • Hyperlipidemia    • Hypertension        Past Surgical History:   Procedure Laterality Date   • CHOLECYSTECTOMY     • GASTRIC BYPASS         Current Outpatient Medications   Medication Sig Dispense Refill   • ALPRAZolam (XANAX) 2 MG tablet Take 1 tablet (2 mg total) by mouth 3 (three) times a day 90 tablet 2   • desvenlafaxine (PRISTIQ) 100 mg 24 hr tablet Take 1 tablet (100 mg total) by mouth daily 90 tablet 1   • lamoTRIgine (LaMICtal) 100 mg tablet Take 1 tablet (100 mg total) by mouth 2 (two) times a day 180 tablet 1   • albuterol (ACCUNEB) 1 25 MG/3ML nebulizer solution Inhale 1 25 mg every 6 (six) hours as needed     • Albuterol Sulfate 108 (90 Base) MCG/ACT AEPB Inhale 2 puffs every 4 (four) hours as needed     • aspirin (ASPIRIN ADULT) 325 mg tablet Take 325 mg by mouth daily     • aspirin (ECOTRIN) 325 mg EC tablet      • budesonide-formoterol (SYMBICORT) 160-4 5 mcg/act inhaler INHALE 2 PUFFS 2 (TWO) TIMES A DAY  RINSE MOUTH AFTER USE       • Cholecalciferol (Vitamin D3) 125 MCG (5000 UT) CAPS      • Cholecalciferol (Vitamin D3) 50 MCG (2000 UT) capsule      • Cholecalciferol 50 MCG (2000 UT) CAPS Take 2 capsules by mouth daily     • cyanocobalamin 500 MCG tablet Take 500 mcg by mouth daily     • Ear Drops 6 5 % otic solution      • ergocalciferol (VITAMIN D2) 50,000 units      • famotidine (PEPCID) 20 mg tablet Take 20 mg by mouth 2 (two) times a day     • ferrous sulfate 325 (65 Fe) mg tablet Take 325 mg by mouth     • fluticasone (FLONASE) 50 mcg/act nasal spray 2 sprays into each nostril daily     • lisinopril (ZESTRIL) 5 mg tablet Take 2 5 mg by mouth daily     • meclizine (ANTIVERT) 25 mg tablet Take 1 tablet (25 mg total) by mouth 3 (three) times a day as needed for dizziness 10 tablet 0   • Multiple Vitamin (Tab-A-Herman/Beta Carotene) TABS TAKE 1 TABLET BY MOUTH DAILY     • Multiple Vitamins-Calcium (SM One Daily Essential) TABS      • Multiple Vitamins-Minerals (MULTIVITAMIN ADULT PO) Take 1 tablet by mouth daily     • Multiple Vitamins-Minerals (MULTIVITAMIN ADULT, MINERALS, PO) Take 1 tablet by mouth daily     • Narcan 4 MG/0 1ML nasal spray      • omeprazole (PriLOSEC) 20 mg delayed release capsule One capsule po daily     • ondansetron (ZOFRAN-ODT) 4 mg disintegrating tablet Take by mouth     • ondansetron (ZOFRAN-ODT) 4 mg disintegrating tablet Take 1 tablet (4 mg total) by mouth every 8 (eight) hours as needed for nausea or vomiting for up to 5 days 10 tablet 0   • pantoprazole (PROTONIX) 40 mg tablet Take 40 mg by mouth daily     • ranitidine (ZANTAC) 150 MG capsule Take 150 mg by mouth     • senna-docusate sodium (SENOKOT S) 8 6-50 mg per tablet TAKE ONE TABLET BY MOUTH TWICE A DAY AS NEEDED FOR CONSTIPATION     • simvastatin (ZOCOR) 20 mg tablet Take 20 mg by mouth     • TiZANidine (ZANAFLEX) 4 MG capsule Take 4 mg by mouth Three times daily as needed     • vitamin B-12 (VITAMIN B-12) 500 mcg tablet TAKE 1 TABLET (500 MCG TOTAL) BY MOUTH DAILY  No current facility-administered medications for this visit  Allergies   Allergen Reactions   • Other Other (See Comments)   • Penicillins      Other reaction(s): Other (See Comments)  hives, elsy  ancef 3/27/03         Review of Systems     Mood Anxiety, Depression and Emotional Lability   Behavior Impulsive Behavior   Thought Content Disturbing Thoughts, Feelings   General Emotional Problems and Decreased Functioning   Personality Normal   Other Psych Symptoms Normal   Constitutional Negative   ENT Negative   Cardiovascular Negative   Respiratory Negative   Gastrointestinal Negative   Genitourinary Negative   Musculoskeletal Negative   Integumentary Negative   Neurological Negative   Endocrine Normal    Other Symptoms Normal        Laboratory Results:   Most Recent Labs:   Lab Results   Component Value Date    WBC 5 35 03/05/2022    RBC 4 03 03/05/2022    HGB 10 3 (L) 03/05/2022    HCT 34 6 (L) 03/05/2022     03/05/2022    RDW 15 3 (H) 03/05/2022    NEUTROABS 1 93 03/05/2022    K 3 9 03/05/2022     (H) 03/05/2022    CO2 28 03/05/2022    BUN 16 03/05/2022    CREATININE 1 27 03/05/2022    CALCIUM 8 5 03/05/2022    AST 19 03/05/2022    ALT 21 03/05/2022    ALKPHOS 106 03/05/2022       Substance Abuse History:  Social History     Substance and Sexual Activity   Drug Use Not on file       Family Psychiatric History: History reviewed  No pertinent family history      The following portions of the patient's history were reviewed and updated as appropriate: allergies, current medications, past family history, past medical history, past social history, past surgical history and problem list     Social History     Socioeconomic History   • Marital status: /Civil Union     Spouse name: Not on file   • Number of children: Not on file   • Years of education: Not on file   • Highest education level: Not on file   Occupational History   • Not on file   Tobacco Use   • Smoking status: Former Smoker   • Smokeless tobacco: Never Used   Substance and Sexual Activity   • Alcohol use: Not Currently   • Drug use: Not on file   • Sexual activity: Not on file   Other Topics Concern   • Not on file   Social History Narrative   • Not on file     Social Determinants of Health     Financial Resource Strain: Not on file   Food Insecurity: Not on file   Transportation Needs: Not on file   Physical Activity: Not on file   Stress: Not on file   Social Connections: Not on file   Intimate Partner Violence: Not on file   Housing Stability: Not on file     Social History     Social History Narrative   • Not on file       Objective:       Mental status:  Appearance calm and cooperative , adequate hygiene and grooming and good eye contact    Mood dysphoric   Affect affect was constricted   Speech a normal rate and fluent   Thought Processes coherent/organized and normal thought processes   Hallucinations no hallucinations present    Thought Content no delusions   Abnormal Thoughts no suicidal thoughts  and no homicidal thoughts    Orientation  oriented to person and place and time   Remote Memory short term memory intact and long term memory intact   Attention Span concentration intact   Intellect Appears to be of Average Intelligence   Insight Limited insight   Judgement judgment was limited   Muscle Strength n/a   Language no difficulty naming common objects and no difficulty repeating a phrase    Fund of Knowledge displays adequate knowledge of current events, adequate fund of knowledge regarding past history and adequate fund of knowledge regarding vocabulary                Assessment/Plan:       Diagnoses and all orders for this visit:    Bipolar affective disorder, currently depressed, moderate (HCC)  -     desvenlafaxine (PRISTIQ) 100 mg 24 hr tablet; Take 1 tablet (100 mg total) by mouth daily  -     lamoTRIgine (LaMICtal) 100 mg tablet; Take 1 tablet (100 mg total) by mouth 2 (two) times a day    Generalized anxiety disorder  -     Discontinue: desvenlafaxine (PRISTIQ) 100 mg 24 hr tablet; Take 1 tablet (100 mg total) by mouth daily  -     desvenlafaxine (PRISTIQ) 100 mg 24 hr tablet; Take 1 tablet (100 mg total) by mouth daily    Bipolar disorder, current episode depressed, severe, without psychotic features ([facility] )  -     Discontinue: desvenlafaxine (PRISTIQ) 100 mg 24 hr tablet; Take 1 tablet (100 mg total) by mouth daily  -     desvenlafaxine (PRISTIQ) 100 mg 24 hr tablet; Take 1 tablet (100 mg total) by mouth daily    MDD (major depressive disorder), recurrent severe, without psychosis ([facility] )  -     Discontinue: lamoTRIgine (LaMICtal) 100 mg tablet; Take 1 tablet (100 mg total) by mouth 2 (two) times a day  -     ALPRAZolam (XANAX) 2 MG tablet; Take 1 tablet (2 mg total) by mouth 3 (three) times a day    Other orders  -     Multiple Vitamins-Calcium (SM One Daily Essential) TABS            Treatment Recommendations- Risks Benefits      Immediate Medical/Psychiatric/Psychotherapy Treatments and Any Precautions: continue current treatment     Risks, Benefits And Possible Side Effects Of Medications:  {PSYCH RISK, BENEFITS AND POSSIBLE SIDE EFFECTS (Optional):36605    Controlled Medication Discussion: Discussed with patient Black Box warning on concurrent use of benzodiazepines and opioid medications including sedation, respiratory depression, coma and death  Patient understands the risk of treatment with benzodiazepines in addition to opioids and wants to continue taking those medications   , Discussed with patient the risks of sedation, respiratory depression, impairment of ability to drive and potential for abuse and addiction related to treatment with benzodiazepine medications  The patient understands risk of treatment with benzodiazepine medications, agrees to not drive if feels impaired and agrees to take medications as prescribed  and The patient has been filling controlled prescriptions on time as prescribed to [facility]       Psychotherapy Provided: Individual psychotherapy provided  Individual psychotherapy provided: Yes  Counseling was provided during the session today for 16 minutes  Medications, treatment progress and treatment plan reviewed with Oralia   Medication education provided to Oralia   Goals discussed during in session: continue improvement in mood stability  Recent stressor including COVID-19 issues, family problems, family conflict, family issues, marital problems, health issues, medical problems, chronic pain, limited support, social difficulties, everyday stressors and ongoing anxiety discussed with Oralia    Coping strategies including compliance with medications, deep/slow breathing, eliminating avoidance, engaging in previously avoided activities, exercising, getting into a good routine, improving self-esteem, increasing energy, increasing interest in usual activities, increasing motivation, increasing social interaction, keeping busy at home, maintain healthy diet, maintain heathy sleeping hygiene and maintain positive attitude reviewed with Oralia    Importance of medication and treatment compliance reviewed with Oralia   Educated on importance of medication and treatment compliance  Importance of follow up with family physician for medical issues reviewed with Oralia   Discussed with Oralia acceptance of mental illness diagnosis and need for ongoing psychiatric treatment    Supportive therapy provided                                     Visit Time    Visit Start Time: 2:00 PM  Visit Stop Time: 2:30 PM  Total Visit Duration: 30 minutes

## 2023-03-02 ENCOUNTER — TELEPHONE (OUTPATIENT)
Dept: PAIN MEDICINE | Facility: MEDICAL CENTER | Age: 58
End: 2023-03-02

## 2023-03-06 ENCOUNTER — CONSULT (OUTPATIENT)
Dept: PAIN MEDICINE | Facility: CLINIC | Age: 58
End: 2023-03-06

## 2023-03-06 VITALS
OXYGEN SATURATION: 98 % | BODY MASS INDEX: 32.14 KG/M2 | HEART RATE: 86 BPM | HEIGHT: 66 IN | SYSTOLIC BLOOD PRESSURE: 132 MMHG | WEIGHT: 200 LBS | DIASTOLIC BLOOD PRESSURE: 88 MMHG

## 2023-03-06 DIAGNOSIS — G89.29 CHRONIC PAIN OF BOTH KNEES: ICD-10-CM

## 2023-03-06 DIAGNOSIS — G89.4 CHRONIC PAIN SYNDROME: ICD-10-CM

## 2023-03-06 DIAGNOSIS — M53.3 SACROILIAC JOINT DYSFUNCTION OF RIGHT SIDE: Primary | ICD-10-CM

## 2023-03-06 DIAGNOSIS — Z98.1 S/P LUMBAR SPINAL FUSION: ICD-10-CM

## 2023-03-06 DIAGNOSIS — M25.561 CHRONIC PAIN OF BOTH KNEES: ICD-10-CM

## 2023-03-06 DIAGNOSIS — M25.562 CHRONIC PAIN OF BOTH KNEES: ICD-10-CM

## 2023-03-06 RX ORDER — ASPIRIN 81 MG/1
325 TABLET, COATED ORAL
COMMUNITY
Start: 2022-12-23

## 2023-03-06 RX ORDER — GABAPENTIN 300 MG/1
300 CAPSULE ORAL 3 TIMES DAILY
Qty: 90 CAPSULE | Refills: 1 | Status: SHIPPED | OUTPATIENT
Start: 2023-03-06 | End: 2023-05-05

## 2023-03-06 RX ORDER — MELOXICAM 15 MG/1
15 TABLET ORAL DAILY
Qty: 30 TABLET | Refills: 1 | Status: SHIPPED | OUTPATIENT
Start: 2023-03-06 | End: 2023-05-05

## 2023-03-06 NOTE — PROGRESS NOTES
Assessment  1  Sacroiliac joint dysfunction of right side    2  S/P lumbar spinal fusion    3  Chronic pain of both knees    4  Chronic pain syndrome        Plan  Patient presenting with chronic back pain, bilateral knee pain for greater than 1 year, worsening over the past 3 months  pain is >7/10 on the pain scale with inability to participate in IADLs for >6 weeks  Patient has been participating with physical therapy which is stil ongoing  I personally reviewed and interpreted all pertinent imaging studies - specifically lumbar MRI from 2016 and discussed results and clinical significance with the patient  Patient is status post L4-5 decompression and fusion with no evidence of epidural fibrosis or significant central or foraminal compromise  Patient does have significant degenerative facet arthropathy at L3-4  I reviewed relevant external notes from the patient's primary care physician and orthopedics in regards to prior treatments tried as mentioned in the history of present illness  Patient is status post left total knee replacement on 12/22/2022  Throughout patient's postoperative course she has been managed with oxycodone, however recently when she saw her orthopedic surgeon she was told that she had to find a pain management provider to manage this  I reviewed all pertinent laboratory studies, specifically renal function, hemoglobin A1c studies, CBC, coagulation studies, prior to initiating any medication therapies or offering interventional treatment options  1   At this time patient's clinical symptoms and exam findings are consistent with right sacroiliac joint dysfunction  Patient does fulfill the criteria for sacroiliac joint injection at this time With the following:     Moderate to severe low back pain over the anatomical location of the right sacroiliac joint below L5,    Pain duration of at least 3 months,    No untreated radiculopathy,    3 positive provocative maneuvers: Riki, compression, distraction test,    Inadequate response to conservative methods for 4 weeks    The SI joint injection will be performed under fluoroscopic guidance, and subsequent therapeutic sacroiliac joint injections will be done if the diagnostic injections provide at least 75% sustained relief for the duration of the local anesthetic or anti-inflammatory   2  Discussed initiating gabapentin 300 mg 3 times daily, along with meloxicam 15 mg daily  I discussed that I would not be maintaining her on chronic opioids for her postoperative knee issues at this time  She would have to discuss this with her orthopedic surgeon as she is still within the global surgical period  3   We will follow-up after right sacroiliac joint injectio and will reassess her response to gabapentin and meloxicam at that time  I spent a total of 60 minutes of time on patient care today  [place] Prescription Drug Monitoring Program report was reviewed and was appropriate     Complete risks and benefits including bleeding, infection, tissue reaction, nerve injury and allergic reaction were discussed  The approach was demonstrated using models and literature was provided  Verbal and written consent was obtained  My impressions and treatment recommendations were discussed in detail with the patient who verbalized understanding and had no further questions  Discharge instructions were provided  I personally saw and examined the patient and I agree with the above discussed plan of care  Orders Placed This Encounter   Procedures   • [place] spine and pain procedure     Standing Status:   Future     Standing Expiration Date:   3/6/2027     Order Specific Question:   Reason for Exam:     Answer:   R sacroiliac joint injection     Order Specific Question:   Is the patient pregnant? Answer:   No     Order Specific Question:   Anticoagulant hold needed?      Answer:   no     New Medications Ordered This Visit   Medications • Aspirin Low Dose 81 MG EC tablet     Si mg   • gabapentin (NEURONTIN) 300 mg capsule     Sig: Take 1 capsule (300 mg total) by mouth 3 (three) times a day     Dispense:  90 capsule     Refill:  1   • meloxicam (MOBIC) 15 mg tablet     Sig: Take 1 tablet (15 mg total) by mouth daily     Dispense:  30 tablet     Refill:  1       History of Present Illness    Oralia Wolf is a 56 y o  female presenting for new patient visit via self-referral at [address] [place] [address] [facility] for exam and evaluation of chronic lower back and knee pain  Symptoms have been present for greater than 1 year, worsening after recent left knee replacement  Patient denies any specific inciting event  Pain is rated as severe, currently 10 out of 10 on the pain scale  Pain does interfere with daily activities  Pain is nearly constant with no typical pattern throughout the day  Pain is described as sharp, pressure-like, throbbing, dull/aching  Patient denies weakness in the lower extremities  Patient utilizes a cane or walker for ambulation  Pain is increased with bending, walking, exercise  Decreased with lying down, relaxation  Patient's past medical history is positive for anxiety, asthma/wheezing, GERD, high cholesterol, arthritis  Patient social history is negative for tobacco, marijuana, alcohol use  Prior treatments include surgery, nerve block/injection, physical therapy, exercise, heat/ice    Medications tried include oxycodone, hydromorphone, morphine, lidocaine patch, Celebrex, ibuprofen, tizanidine    I have personally reviewed and/or updated the patient's past medical history, past surgical history, family history, social history, current medications, allergies, and vital signs today  Review of Systems   Constitutional: Negative for chills and fever  HENT: Negative for ear pain and sore throat  Eyes: Negative for pain and visual disturbance     Respiratory: Negative for cough and shortness of breath  Cardiovascular: Negative for chest pain and palpitations  Gastrointestinal: Negative for abdominal pain and vomiting  Genitourinary: Negative for dysuria and hematuria  Musculoskeletal: Positive for arthralgias, back pain, gait problem, myalgias and neck pain  Skin: Negative for color change and rash  Neurological: Positive for weakness and numbness (foot)  Negative for seizures and syncope  All other systems reviewed and are negative  Patient Active Problem List   Diagnosis   • Allergic rhinitis due to allergen   • Asthma, moderate persistent   • Bipolar disorder ([facility] [address] )   • Cerebral artery occlusion with cerebral infarction [facility])   • Esophageal reflux   • Generalized anxiety disorder   • History of bariatric surgery   • B12 deficiency anemia   • Iron deficiency anemia   • Mixed hyperlipidemia   • Perimenopause   • Spondylolisthesis, congenital   • History of CVA (cerebrovascular accident)   • Ankle swelling   • Acute right ankle pain   • Carpal tunnel syndrome, bilateral   • Fatigue   • Foot pain, left   • History of Rin-en-Y gastric bypass       Past Medical History:   Diagnosis Date   • Hyperlipidemia    • Hypertension        Past Surgical History:   Procedure Laterality Date   • CHOLECYSTECTOMY     • GASTRIC BYPASS         History reviewed  No pertinent family history      Social History     Occupational History   • Not on file   Tobacco Use   • Smoking status: Former   • Smokeless tobacco: Never   Substance and Sexual Activity   • Alcohol use: Not Currently   • Drug use: Not on file   • Sexual activity: Not on file       Current Outpatient Medications on File Prior to Visit   Medication Sig   • albuterol (ACCUNEB) 1 25 MG/3ML nebulizer solution Inhale 1 25 mg every 6 (six) hours as needed   • Albuterol Sulfate 108 (90 Base) MCG/ACT AEPB Inhale 2 puffs every 4 (four) hours as needed   • aspirin 325 mg tablet Take 325 mg by mouth daily   • Cholecalciferol (Vitamin D3) 125 MCG (5000 UT) CAPS    • Cholecalciferol 50 MCG (2000 UT) CAPS Take 2 capsules by mouth daily   • ergocalciferol (VITAMIN D2) 50,000 units    • meclizine (ANTIVERT) 25 mg tablet Take 1 tablet (25 mg total) by mouth 3 (three) times a day as needed for dizziness   • Multiple Vitamin (Tab-A-Herman/Beta Carotene) TABS TAKE 1 TABLET BY MOUTH DAILY   • omeprazole (PriLOSEC) 20 mg delayed release capsule One capsule po daily   • senna-docusate sodium (SENOKOT S) 8 6-50 mg per tablet TAKE ONE TABLET BY MOUTH TWICE A DAY AS NEEDED FOR CONSTIPATION   • simvastatin (ZOCOR) 20 mg tablet Take 20 mg by mouth   • TiZANidine (ZANAFLEX) 4 MG capsule Take 4 mg by mouth Three times daily as needed   • vitamin B-12 (VITAMIN B-12) 500 mcg tablet TAKE 1 TABLET (500 MCG TOTAL) BY MOUTH DAILY  • ALPRAZolam (XANAX) 2 MG tablet Take 1 tablet (2 mg total) by mouth 3 (three) times a day (Patient not taking: Reported on 3/6/2023)   • aspirin (ECOTRIN) 325 mg EC tablet  (Patient not taking: Reported on 3/6/2023)   • Aspirin Low Dose 81 MG EC tablet 325 mg (Patient not taking: Reported on 3/6/2023)   • budesonide-formoterol (SYMBICORT) 160-4 5 mcg/act inhaler INHALE 2 PUFFS 2 (TWO) TIMES A DAY   RINSE MOUTH AFTER USE  (Patient not taking: Reported on 3/6/2023)   • Cholecalciferol (Vitamin D3) 50 MCG (2000 UT) capsule    • cyanocobalamin 500 MCG tablet Take 500 mcg by mouth daily (Patient not taking: Reported on 3/6/2023)   • desvenlafaxine (PRISTIQ) 100 mg 24 hr tablet Take 1 tablet (100 mg total) by mouth daily (Patient not taking: Reported on 3/6/2023)   • Ear Drops 6 5 % otic solution  (Patient not taking: Reported on 3/6/2023)   • famotidine (PEPCID) 20 mg tablet Take 20 mg by mouth 2 (two) times a day (Patient not taking: Reported on 3/6/2023)   • ferrous sulfate 325 (65 Fe) mg tablet Take 325 mg by mouth (Patient not taking: Reported on 3/6/2023)   • fluticasone (FLONASE) 50 mcg/act nasal spray 2 sprays into each nostril daily (Patient not taking: Reported on 3/6/2023)   • lamoTRIgine (LaMICtal) 100 mg tablet Take 1 tablet (100 mg total) by mouth 2 (two) times a day (Patient not taking: Reported on 3/6/2023)   • lisinopril (ZESTRIL) 5 mg tablet Take 2 5 mg by mouth daily (Patient not taking: Reported on 3/6/2023)   • Multiple Vitamins-Calcium (SM One Daily Essential) TABS  (Patient not taking: Reported on 3/6/2023)   • Multiple Vitamins-Minerals (MULTIVITAMIN ADULT PO) Take 1 tablet by mouth daily (Patient not taking: Reported on 3/6/2023)   • Multiple Vitamins-Minerals (MULTIVITAMIN ADULT, MINERALS, PO) Take 1 tablet by mouth daily (Patient not taking: Reported on 3/6/2023)   • Narcan 4 MG/0 1ML nasal spray  (Patient not taking: Reported on 3/6/2023)   • ondansetron (ZOFRAN-ODT) 4 mg disintegrating tablet Take by mouth (Patient not taking: Reported on 3/6/2023)   • ondansetron (ZOFRAN-ODT) 4 mg disintegrating tablet Take 1 tablet (4 mg total) by mouth every 8 (eight) hours as needed for nausea or vomiting for up to 5 days   • pantoprazole (PROTONIX) 40 mg tablet Take 40 mg by mouth daily (Patient not taking: Reported on 3/6/2023)   • ranitidine (ZANTAC) 150 MG capsule Take 150 mg by mouth (Patient not taking: Reported on 3/6/2023)     No current facility-administered medications on file prior to visit  Allergies   Allergen Reactions   • Acetaminophen Hives     Pt reports rash on feet and itchy sensation tongue   • Other Other (See Comments)   • Penicillins      Other reaction(s): Other (See Comments)  hives, elsy  ancef 3/27/03         Physical Exam    /88   Pulse 86   Ht 5' 5 5" (1 664 m)   Wt 90 7 kg (200 lb)   SpO2 98%   BMI 32 78 kg/m²     Constitutional: normal, well developed, well nourished, alert, in no distress and non-toxic and no overt pain behavior    Eyes: anicteric  HEENT: grossly intact  Neck: symmetric, trachea midline and no masses   Pulmonary:even and unlabored  Cardiovascular:No edema or pitting edema present  Skin:Normal without rashes or lesions and well hydrated  Psychiatric:Mood and affect appropriate  Neurologic: Motor function is grossly intact  Sensation is intact and symmetric to light touch bilaterally in the lower extremity dermatomes  Antalgic gait with use of rolling walker  Musculoskeletal: Tenderness in the left lower lumbar region over the sacroiliac joint  Positive left sacroiliac distraction test, positive left sacroiliac compression test, positive left Masood's test   Pain with bilateral knee active and passive range of motion  Pain with lumbar spine active and passive range of motion  Imaging    MRI lumbar spine 12/6/2016:   Clinical information: Status post spinal decompression  Technique: MRI of the lumbar spine was performed with and without intravenous contrast using sagittal T2, sagittal STIR, sagittal T1, sagittal T1 postcontrast, axial T2, axial T1, and axial T1 postcontrast sequences  8 5 ML of Gadavist was administered  Comparison: CT lumbar spine dated 4/19/2014, MRI lumbar spine dated 8/18/2012  Findings: For numbering purposes, 5 lumbar type vertebrae are assumed  Status post decompression and instrumented fusion at L4-5 with post surgical changes including scar tissue around the thecal sac  No change in grade 1 anterolisthesis of L4 with respect to L5  The vertebral body heights are preserved  No acute fracture identified  Disc desiccation at L5-S1  Facet arthropathy and ligamentum flavum thickening at multiple levels, most severe at L3-4  Disc bulges at L2-3, L3-4  Mild central canal stenosis at L3-4  No significant central canal stenosis at the other levels  Mild bilateral foraminal stenosis at L3-4  The conus medullaris appears normal, terminating at L1-2  IMPRESSION:   Status post decompression and instrumented fusion at L4-5 with postsurgical changes  Multilevel lumbar spondylosis   Mild central canal and bilateral foraminal   stenosis at L3-4

## 2023-04-27 ENCOUNTER — TELEPHONE (OUTPATIENT)
Dept: PSYCHIATRY | Facility: CLINIC | Age: 58
End: 2023-04-27

## 2023-04-27 NOTE — TELEPHONE ENCOUNTER
Pt called in to find out when her next appt with Dr Lynnette Rae was   Writer was able to provide this information

## 2023-05-01 ENCOUNTER — TELEMEDICINE (OUTPATIENT)
Dept: PSYCHIATRY | Facility: CLINIC | Age: 58
End: 2023-05-01

## 2023-05-01 DIAGNOSIS — F31.32 BIPOLAR AFFECTIVE DISORDER, CURRENTLY DEPRESSED, MODERATE (HCC): Primary | ICD-10-CM

## 2023-05-01 DIAGNOSIS — F41.1 GENERALIZED ANXIETY DISORDER: ICD-10-CM

## 2023-05-01 PROBLEM — M17.12 OSTEOARTHRITIS OF LEFT KNEE: Status: ACTIVE | Noted: 2022-12-22

## 2023-05-01 PROBLEM — I10 BENIGN ESSENTIAL HYPERTENSION: Status: ACTIVE | Noted: 2022-11-26

## 2023-05-01 PROBLEM — M17.0 PRIMARY OSTEOARTHRITIS OF BOTH KNEES: Status: ACTIVE | Noted: 2022-11-26

## 2023-05-01 RX ORDER — OXYCODONE HYDROCHLORIDE 5 MG/1
TABLET ORAL
COMMUNITY
Start: 2023-04-18

## 2023-05-01 RX ORDER — TOBRAMYCIN 3 MG/ML
SOLUTION/ DROPS OPHTHALMIC
COMMUNITY
Start: 2023-03-30

## 2023-05-01 RX ORDER — CLINDAMYCIN HYDROCHLORIDE 150 MG/1
CAPSULE ORAL
COMMUNITY
Start: 2023-02-20

## 2023-05-01 RX ORDER — TIZANIDINE 4 MG/1
TABLET ORAL
COMMUNITY
Start: 2023-04-10

## 2023-05-01 NOTE — BH TREATMENT PLAN
TREATMENT PLAN (Medication Management Only)        [facility]    Name and Date of Birth:  Charlene Queen 66 y o  1965  Date of Treatment Plan: May 1, 2023  Diagnosis/Diagnoses:    1  Bipolar affective disorder, currently depressed, moderate ([address] )    2  Generalized anxiety disorder      Strengths/Personal Resources for Self-Care: supportive family, taking medications as prescribed, ability to communicate needs  Area/Areas of need (in own words): anxiety, anxiety symptoms, depression, depressive symptoms, mood instability  1  Long Term Goal: maintain control of mood stability  Target Date:6 months - 11/1/2023  Person/Persons responsible for completion of goal: Oralia  2  Short Term Objective (s) - How will we reach this goal?:   A  Provider new recommended medication/dosage changes and/or continue medication(s): patient stopped all medications 3 months ago  B  N/A   C  N/A  Target Date:6 months - 11/1/2023  Person/Persons Responsible for Completion of Goal: Crystal  Progress Towards Goals: continuing treatment  Treatment Modality: medication management every 6 months  Review due 180 days from date of this plan: 6 months - 11/1/2023  Expected length of service: ongoing treatment  My Physician/PA/NP and I have developed this plan together and I agree to work on the goals and objectives  I understand the treatment goals that were developed for my treatment

## 2023-05-01 NOTE — PSYCH
Virtual Regular Visit    Verification of patient location:    Patient is located at Home in the following state in which I hold an active license [place]      Assessment/Plan:    Problem List Items Addressed This Visit        Other    Bipolar disorder ([address] ) - Primary    Generalized anxiety disorder                Reason for visit is   Chief Complaint   Patient presents with    Virtual Regular Visit        Encounter provider Rani Castillo MD    Provider located at [address] [place] [id number] [phone]      Recent Visits  Date Type Provider Dept   04/27/23 Telephone MD Oliverio Mccall 18 recent visits within past 7 days and meeting all other requirements  Today's Visits  Date Type Provider Dept   05/01/23 MD Oliverio Barton 18 today's visits and meeting all other requirements  Future Appointments  No visits were found meeting these conditions  Showing future appointments within next 150 days and meeting all other requirements       The patient was identified by name and date of birth  Oralia Ambrocio was informed that this is a telemedicine visit and that the visit is being conducted throughthe [facility]  She agrees to proceed     My office door was closed  No one else was in the room  She acknowledged consent and understanding of privacy and security of the video platform  The patient has agreed to participate and understands they can discontinue the visit at any time  Patient is aware this is a billable service  Abigail  Marya Ilsas is a 66 y o  female with Bipolar do      She stated since last seen she she lost her sister to DM complications, last July   Also she stated she had been dealing with knee pain and she tried injections but they have not worked and she has swelling and difficulties walking  She stated due to the amount of pain and the limitation on her daily life she is pursuing bilateral knee replacement     She stated last March she had total knee replacement on the left side  She stated her recovery has been slow  She also lost a cousin due to complications of hip replacement surgery  She stated that she stopped all her psychiatric medications 3 months ago  out of fear of having kidney or liver problem and fear of dying  Currently her LFT's and kidney function are WNL according to blood work on March 2023  She stated she also stopped her BP medication and her PCP is monitoring her condition closely  She continues aspirin, cholesterol medications and her vitamins  She stated she has been managing her anxiety and depression using her coping skills and her el  She will like to stay off medications but agrees to notify me if she becomes depressed, manic  or has thoughts  of hurting herself  She agrees to schedule follow up in 3 months        HPI     Past Medical History:   Diagnosis Date    Hyperlipidemia     Hypertension        Past Surgical History:   Procedure Laterality Date    CHOLECYSTECTOMY      GASTRIC BYPASS         Current Outpatient Medications   Medication Sig Dispense Refill    clindamycin (CLEOCIN) 150 mg capsule       albuterol (ACCUNEB) 1 25 MG/3ML nebulizer solution Inhale 1 25 mg every 6 (six) hours as needed      Albuterol Sulfate 108 (90 Base) MCG/ACT AEPB Inhale 2 puffs every 4 (four) hours as needed      aspirin 325 mg tablet Take 325 mg by mouth daily      Cholecalciferol (Vitamin D3) 125 MCG (5000 UT) CAPS       Cholecalciferol (Vitamin D3) 50 MCG (2000 UT) capsule       Cholecalciferol 50 MCG (2000 UT) CAPS Take 2 capsules by mouth daily      ergocalciferol (VITAMIN D2) 50,000 units       gabapentin (NEURONTIN) 300 mg capsule Take 1 capsule (300 mg total) by mouth 3 (three) times a day 90 capsule 1    meclizine (ANTIVERT) 25 mg tablet Take 1 tablet (25 mg total) by mouth 3 (three) times a day as needed for dizziness 10 tablet 0    meloxicam (MOBIC) 15 mg tablet Take 1 tablet (15 mg total) by mouth daily 30 tablet 1    Multiple Vitamin (Tab-A-Herman/Beta Carotene) TABS TAKE 1 TABLET BY MOUTH DAILY      omeprazole (PriLOSEC) 20 mg delayed release capsule One capsule po daily      oxyCODONE (ROXICODONE) 5 immediate release tablet       senna-docusate sodium (SENOKOT S) 8 6-50 mg per tablet TAKE ONE TABLET BY MOUTH TWICE A DAY AS NEEDED FOR CONSTIPATION      simvastatin (ZOCOR) 20 mg tablet Take 20 mg by mouth      TiZANidine (ZANAFLEX) 4 MG capsule Take 4 mg by mouth Three times daily as needed      tiZANidine (ZANAFLEX) 4 mg tablet       tobramycin (TOBREX) 0 3 % SOLN       vitamin B-12 (VITAMIN B-12) 500 mcg tablet TAKE 1 TABLET (500 MCG TOTAL) BY MOUTH DAILY  No current facility-administered medications for this visit  Allergies   Allergen Reactions    Acetaminophen Hives     Pt reports rash on feet and itchy sensation tongue    Other Other (See Comments)    Penicillins      Other reaction(s): Other (See Comments)  hives, elsy  ancef 3/27/03         Review of Systems     Mood Anxiety, Depression and Emotional Lability   Behavior Impulsive Behavior   Thought Content Disturbing Thoughts, Feelings   General Emotional Problems and Decreased Functioning   Personality Normal   Other Psych Symptoms Normal   Constitutional Negative   ENT Negative   Cardiovascular Negative   Respiratory Negative   Gastrointestinal Negative   Genitourinary Negative   Musculoskeletal Negative   Integumentary Negative   Neurological Negative   Endocrine Normal    Other Symptoms Normal        Laboratory Results:   Recent Labs (last 12 months):   No visits with results within 12 Month(s) from this visit     Latest known visit with results is:   Admission on 03/05/2022, Discharged on 03/05/2022   Component Date Value    WBC 03/05/2022 5 35     RBC 03/05/2022 4 03     Hemoglobin 03/05/2022 10 3 (L)     Hematocrit 03/05/2022 34 6 (L)     MCV 03/05/2022 86     MCH 03/05/2022 25 6 (L)     MCHC 03/05/2022 29 8 (L)     RDW 03/05/2022 15 3 (H)     MPV 03/05/2022 9 6     Platelets 49/24/8458 237     nRBC 03/05/2022 0     Neutrophils Relative 03/05/2022 36 (L)     Immat GRANS % 03/05/2022 0     Lymphocytes Relative 03/05/2022 52 (H)     Monocytes Relative 03/05/2022 9     Eosinophils Relative 03/05/2022 2     Basophils Relative 03/05/2022 1     Neutrophils Absolute 03/05/2022 1 93     Immature Grans Absolute 03/05/2022 0 01     Lymphocytes Absolute 03/05/2022 2 77     Monocytes Absolute 03/05/2022 0 46     Eosinophils Absolute 03/05/2022 0 13     Basophils Absolute 03/05/2022 0 05     Protime 03/05/2022 24 7 (H)     INR 03/05/2022 2 34 (H)     PTT 03/05/2022 56 (H)     Sodium 03/05/2022 145     Potassium 03/05/2022 3 9     Chloride 03/05/2022 109 (H)     CO2 03/05/2022 28     ANION GAP 03/05/2022 8     BUN 03/05/2022 16     Creatinine 03/05/2022 1 27     Glucose 03/05/2022 161 (H)     Calcium 03/05/2022 8 5     Corrected Calcium 03/05/2022 9 1     AST 03/05/2022 19     ALT 03/05/2022 21     Alkaline Phosphatase 03/05/2022 106     Total Protein 03/05/2022 7 0     Albumin 03/05/2022 3 3 (L)     Total Bilirubin 03/05/2022 0 21     eGFR 03/05/2022 47     Protime 03/05/2022 13 6     INR 03/05/2022 1 07     Ventricular Rate 03/05/2022 84     Atrial Rate 03/05/2022 84     WV Interval 03/05/2022 160     QRSD Interval 03/05/2022 86     QT Interval 03/05/2022 352     QTC Interval 03/05/2022 415     P Axis 03/05/2022 74     QRS Axis 03/05/2022 89     T Wave Axis 03/05/2022 0        Substance Abuse History:  Social History     Substance and Sexual Activity   Drug Use Not on file       Family Psychiatric History: History reviewed  No pertinent family history      The following portions of the patient's history were reviewed and updated as appropriate: allergies, current medications, past family history, past medical history, past social history, past surgical history and problem list     Social History     Socioeconomic History    Marital status:      Spouse name: Not on file    Number of children: Not on file    Years of education: Not on file    Highest education level: Not on file   Occupational History    Not on file   Tobacco Use    Smoking status: Former    Smokeless tobacco: Never   Substance and Sexual Activity    Alcohol use: Not Currently    Drug use: Not on file    Sexual activity: Not on file   Other Topics Concern    Not on file   Social History Narrative    Not on file     Social Determinants of Health     Financial Resource Strain: Not on file   Food Insecurity: Not on file   Transportation Needs: Not on file   Physical Activity: Not on file   Stress: Not on file   Social Connections: Not on file   Intimate Partner Violence: Not on file   Housing Stability: Not on file     Social History     Social History Narrative    Not on file       Objective:       Mental status:  Appearance calm and cooperative , adequate hygiene and grooming and good eye contact    Mood dysphoric   Affect affect was constricted   Speech a normal rate and fluent   Thought Processes coherent/organized and normal thought processes   Hallucinations no hallucinations present    Thought Content no delusions   Abnormal Thoughts somatic preoccupation, no suicidal thoughts  and no homicidal thoughts    Orientation  oriented to person and place and time   Remote Memory short term memory intact and long term memory intact   Attention Span concentration intact   Intellect Appears to be of Average Intelligence   Insight Limited insight   Judgement judgment was limited   Muscle Strength n/a   Language no difficulty naming common objects and no difficulty repeating a phrase    Fund of Knowledge displays adequate knowledge of current events, adequate fund of knowledge regarding past history and adequate fund of knowledge regarding vocabulary                Assessment/Plan:       Diagnoses and all orders for this visit:    Bipolar affective disorder, currently depressed, moderate (HCC)    Generalized anxiety disorder    Other orders  -     clindamycin (CLEOCIN) 150 mg capsule  -     oxyCODONE (ROXICODONE) 5 immediate release tablet  -     tiZANidine (ZANAFLEX) 4 mg tablet  -     tobramycin (TOBREX) 0 3 % SOLN            Treatment Recommendations- Risks Benefits      Immediate Medical/Psychiatric/Psychotherapy Treatments and Any Precautions: patient has been off all medications for the past 3 months    Risks, Benefits And Possible Side Effects Of Medications:  {PSYCH RISK, BENEFITS AND POSSIBLE SIDE EFFECTS (Optional):34437    Controlled Medication Discussion: Discussed with patient Black Box warning on concurrent use of benzodiazepines and opioid medications including sedation, respiratory depression, coma and death  Patient understands the risk of treatment with benzodiazepines in addition to opioids and wants to continue taking those medications  , Discussed with patient the risks of sedation, respiratory depression, impairment of ability to drive and potential for abuse and addiction related to treatment with benzodiazepine medications  The patient understands risk of treatment with benzodiazepine medications, agrees to not drive if feels impaired and agrees to take medications as prescribed  and The patient has been filling controlled prescriptions on time as prescribed to [facility]       Psychotherapy Provided: Individual psychotherapy provided  Individual psychotherapy provided: Yes  Counseling was provided during the session today for 16 minutes    Medications, treatment progress and treatment plan reviewed with Crystal   Medication education provided to Crystal   Goals discussed during in session: continue improvement in mood stability  Recent stressor including COVID-19 issues, family problems, family conflict, family issues, marital problems, health issues, medical problems, chronic pain, limited support, social difficulties, everyday stressors and ongoing anxiety discussed with Oralia    Coping strategies including compliance with medications, deep/slow breathing, eliminating avoidance, engaging in previously avoided activities, exercising, getting into a good routine, improving self-esteem, increasing energy, increasing interest in usual activities, increasing motivation, increasing social interaction, keeping busy at home, maintain healthy diet, maintain heathy sleeping hygiene and maintain positive attitude reviewed with Oralia    Importance of medication and treatment compliance reviewed with Oralia   Educated on importance of medication and treatment compliance  Importance of follow up with family physician for medical issues reviewed with Oralia   Discussed with Oralia acceptance of mental illness diagnosis and need for ongoing psychiatric treatment    Supportive therapy provided                            Visit Time    Visit Start Time: 11:00  Visit Stop Time: 11:30  Total Visit Duration: 30 minutes

## 2023-05-03 ENCOUNTER — TELEPHONE (OUTPATIENT)
Dept: PSYCHIATRY | Facility: CLINIC | Age: 58
End: 2023-05-03

## 2023-09-12 ENCOUNTER — TELEPHONE (OUTPATIENT)
Dept: PSYCHIATRY | Facility: CLINIC | Age: 58
End: 2023-09-12

## 2023-09-12 NOTE — TELEPHONE ENCOUNTER
Patient contacted the office to schedule a follow up visit with provider. Patient is now scheduled for 10/16/23  at 11:30a virtually.

## 2023-10-02 NOTE — TELEPHONE ENCOUNTER
Caller: patient    Doctor: armando    Reason for call: patient has an appt for Monday, she called to get her Disc and they stated we would need to fax them our info to why we need it  Patient got her MRI and xr at [facility]     FAX# [phone]    Call back#: All other review of systems negative, except as noted in HPI

## 2024-01-19 ENCOUNTER — TELEPHONE (OUTPATIENT)
Dept: PSYCHIATRY | Facility: CLINIC | Age: 59
End: 2024-01-19

## 2024-01-19 NOTE — TELEPHONE ENCOUNTER
Patient has been added to the Talk Therapy wait list without a referral.    Insurance: [employer] (pt shared will get a new insurance [employer] in February, will call with new info)  Insurance Type:    Commercial []   Medicaid []   [facility] (if applicable)   Medicare []  Location Preference: [place]  Provider Preference: n/a  Virtual: Yes [x] No []  Were outside resources sent: Yes [] No [x]

## 2024-02-14 ENCOUNTER — TELEPHONE (OUTPATIENT)
Dept: PSYCHIATRY | Facility: CLINIC | Age: 59
End: 2024-02-14

## 2024-02-14 NOTE — TELEPHONE ENCOUNTER
New insurance     Primary/Secondary/Tertiary: Primary  Name of Insurance:   [employer]  ID [id number]  Payer [id number]  Insurance Phone Number:   [phone]  Subscriber:   Self   Subscriber :   [id number]  Subscriber Address:   [address] [place] [address]   Subscriber Employment Status: Employed  Subscriber Sex: Female  Relationship to Subscriber: Self

## 2024-02-28 ENCOUNTER — TELEPHONE (OUTPATIENT)
Dept: PSYCHIATRY | Facility: CLINIC | Age: 59
End: 2024-02-28

## 2024-02-28 NOTE — TELEPHONE ENCOUNTER
"Behavioral Health Outpatient Intake Questions    Referred By   : Self    Please advise interviewee that they need to answer all questions truthfully to allow for best care, and any misrepresentations of information may affect their ability to be seen at this clinic   => Was this discussed? Yes     If Minor Child (under age 18)    Who is/are the legal guardian(s) of the child?     Is there a custody agreement? No     If \"YES\"- Custody orders must be obtained prior to scheduling the first appointment  In addition, Consent to Treatment must be signed by all legal guardians prior to scheduling the first appointment    If \"NO\"- Consent to Treatment must be signed by all legal guardians prior to scheduling the first appointment    Behavioral Health Outpatient Intake History -     Presenting Problem (in patient's own words):   Needed to get back into therapy    Are there any communication barriers for this patient?     No                                               If yes, please describe barriers:   If there is a unique situation, please refer to Bartolo Singletary/Sofiya Barksdale for final determination.    Are you taking any psychiatric medications? No     If \"YES\" -What are they      If \"YES\" -Who prescribes?     Has the Patient previously received outpatient Talk Therapy or Medication Management from [facility]  No        If \"YES\"- When, Where and with Whom?         If \"NO\" -Has Patient received these services elsewhere?       If \"YES\" -When, Where, and with Whom?    Has the Patient abused alcohol or other substances in the last 6 months ? No  No concerns of substance abuse are reported.     If \"YES\" -What substance, How much, How often?     If illegal substance: Refer to [facility] (for AVEL) or [facility].   If Alcohol in excess of 10 drinks per week:  Refer to [facility] (for AVEL) or [facility]    Legal History-     Is this treatment court ordered? No   If \"yes \"send to :  Talk Therapy : Send to Bartolo" "Jimmie Barksdale for final determination   Med Management: Send to Dr Blair for final determination     Has the Patient been convicted of a felony?  No   If \"Yes\" send to -When, What?  Talk Therapy : Send to Bartolo Barksdale for final determination   Med Management: Send to Dr Blair for final determination     ACCEPTED as a patient Yes  If \"Yes\" Appointment Date: 3/4/2024 with Edel Gibson    Referred Elsewhere? No  If “Yes” - (Where? Ex: [facility], [facility], [facility], [facility], etc.)       Name of Insurance Co: [employer] [facility]  Insurance ID# [id number]  Insurance Phone #   If ins is primary or secondary? Primary    "

## 2024-03-04 ENCOUNTER — TELEMEDICINE (OUTPATIENT)
Dept: BEHAVIORAL/MENTAL HEALTH CLINIC | Facility: CLINIC | Age: 59
End: 2024-03-04
Payer: COMMERCIAL

## 2024-03-04 DIAGNOSIS — F31.32 BIPOLAR AFFECTIVE DISORDER, CURRENTLY DEPRESSED, MODERATE (HCC): Primary | ICD-10-CM

## 2024-03-04 DIAGNOSIS — F41.1 GENERALIZED ANXIETY DISORDER: ICD-10-CM

## 2024-03-04 PROCEDURE — 90791 PSYCH DIAGNOSTIC EVALUATION: CPT

## 2024-03-04 NOTE — PSYCH
Virtual Regular Visit    Verification of patient location:    Patient is located at Home in the following state in which I hold an active license [place]      Assessment/Plan:    Problem List Items Addressed This Visit       Bipolar disorder (HCC) - Primary    Generalized anxiety disorder       Goals addressed in session: No goals due to initial intake assessment.         Reason for visit is No chief complaint on file.       Encounter provider Edel Gibson LPC    Provider located at [facility] [address] [address] [phone]      Recent Visits  No visits were found meeting these conditions.  Showing recent visits within past 7 days and meeting all other requirements  Today's Visits  Date Type Provider Dept   03/04/24 Telemedicine Edel Gibson LPC [facility] [address]   Showing today's visits and meeting all other requirements  Future Appointments  No visits were found meeting these conditions.  Showing future appointments within next 150 days and meeting all other requirements       The patient was identified by name and date of birth. Oralia Ambrocio was informed that this is a telemedicine visit and that the visit is being conducted throughthe Epic Embedded platform. She agrees to proceed..  My office door was closed. No one else was in the room.  She acknowledged consent and understanding of privacy and security of the video platform. The patient has agreed to participate and understands they can discontinue the visit at any time.    Patient is aware this is a billable service.     Subjective  Oralia Ambrocio is a 58 y.o. female .      HPI     Past Medical History:   Diagnosis Date    Hyperlipidemia     Hypertension        Past Surgical History:   Procedure Laterality Date    CHOLECYSTECTOMY      GASTRIC BYPASS         Current Outpatient Medications   Medication Sig Dispense Refill     albuterol (ACCUNEB) 1.25 MG/3ML nebulizer solution Inhale 1.25 mg every 6 (six) hours as needed      Albuterol Sulfate 108 (90 Base) MCG/ACT AEPB Inhale 2 puffs every 4 (four) hours as needed      aspirin 325 mg tablet Take 325 mg by mouth daily      Cholecalciferol (Vitamin D3) 125 MCG (5000 UT) CAPS       Cholecalciferol (Vitamin D3) 50 MCG (2000 UT) capsule       Cholecalciferol 50 MCG (2000 UT) CAPS Take 2 capsules by mouth daily      clindamycin (CLEOCIN) 150 mg capsule       ergocalciferol (VITAMIN D2) 50,000 units       gabapentin (NEURONTIN) 300 mg capsule Take 1 capsule (300 mg total) by mouth 3 (three) times a day 90 capsule 1    meclizine (ANTIVERT) 25 mg tablet Take 1 tablet (25 mg total) by mouth 3 (three) times a day as needed for dizziness 10 tablet 0    meloxicam (MOBIC) 15 mg tablet Take 1 tablet (15 mg total) by mouth daily 30 tablet 1    Multiple Vitamin (Tab-A-Herman/Beta Carotene) TABS TAKE 1 TABLET BY MOUTH DAILY      omeprazole (PriLOSEC) 20 mg delayed release capsule One capsule po daily      oxyCODONE (ROXICODONE) 5 immediate release tablet       senna-docusate sodium (SENOKOT S) 8.6-50 mg per tablet TAKE ONE TABLET BY MOUTH TWICE A DAY AS NEEDED FOR CONSTIPATION      simvastatin (ZOCOR) 20 mg tablet Take 20 mg by mouth      TiZANidine (ZANAFLEX) 4 MG capsule Take 4 mg by mouth Three times daily as needed      tiZANidine (ZANAFLEX) 4 mg tablet       tobramycin (TOBREX) 0.3 % SOLN       vitamin B-12 (VITAMIN B-12) 500 mcg tablet TAKE 1 TABLET (500 MCG TOTAL) BY MOUTH DAILY.       No current facility-administered medications for this visit.        Allergies   Allergen Reactions    Acetaminophen Hives     Pt reports rash on feet and itchy sensation tongue    Other Other (See Comments)    Penicillins      Other reaction(s): Other (See Comments)  hives, elsy. ancef 3/27/03         Review of Systems   Respiratory:  Positive for chest tightness.    Psychiatric/Behavioral:  Positive for sleep  disturbance. The patient is nervous/anxious and has insomnia.        Video Exam    There were no vitals filed for this visit.    Physical Exam  Psychiatric:         Behavior: Behavior is cooperative.        Behavioral Health Psychotherapy Assessment    Date of Initial Psychotherapy Assessment: 03/04/24  Referral Source: Self   Has a release of information been signed for the referral source? NA    Preferred Name: Oralia Ambrocio  Preferred Pronouns: She/her  YOB: 1965 Age: 58 y.o.  Sex assigned at birth: female   Gender Identity: Female  Race:   Preferred Language: English    Emergency Contact:  Full Name:  Robert Finney   Relationship to Client: Daughter  Contact information: [phone]    Primary Care Physician:  Norma Carroll MD  [address]  [facility] [address]  [phone]  Has a release of information been signed? NA    Physical Health History:  Past surgical procedures: Knee surgery in December, Gastric bypass in 2002, Gallbadder removed, back surgery.   Do you have a history of any of the following: other None  Do you have any mobility issues? No    Relevant Family History:  Meir (brother)- Depression, Bipolar Disorder, Anxiety     Presenting Problem (What brings you in?)  Grief, Anxiety, Depression    Mental Health Advance Directive:  Do you currently have a Mental Health Advance Directive?no    Diagnosis:   Diagnosis ICD-10-CM Associated Orders   1. Bipolar affective disorder, currently depressed, moderate ([facility])  F31.32       2. Generalized anxiety disorder  F41.1           Initial Assessment:     Current Mental Status:    Appearance: appropriate      Behavior/Manner: cooperative      Affect/Mood:  Stable, good and euthymic    Speech:  Normal    Sleep:  Insomnia    Oriented to: oriented to self, oriented to place and oriented to time       Clinical Symptoms    Depression: yes      Anxiety: yes      Depression Symptoms: depressed mood,  restlessness, serious loss of interest in things, excessive crying, social isolation, sleep disturbance, insomnia and irritable      Anxiety Symptoms: excessive worry, irritable, fear of losing control, nervous/anxious, difficulty controlling worry, restlessness and chest tightness      Have you ever been assaultive to others or the environment: No      Have you ever been self-injurious: No      Counseling History:  Previous Counseling or Treatment  (Mental Health or Drug & Alcohol): Yes    Previous Counseling Details:  Over 10 years ago - JASON saw a therapist there   Have you previously taken psychiatric medications: Yes    Previous Medications Attempted:  See chart    Suicide Risk Assessment  Have you ever had a suicide attempt: No    Have you had incidents of suicidal ideation: No    Are you currently experiencing suicidal thoughts: No      Substance Abuse/Addiction Assessment:  Alcohol: No    Heroin: No    Fentanyl: No    Opiates: No    Cocaine: No    Amphetamines: No    Hallucinogens: No    Club Drugs: No    Benzodiazepines: No    Other Rx Meds: No    Marijuana: No    Tobacco/Nicotine: No    Have you experienced blackouts as a result of substance use: No    Have you had any periods of abstinence: No    Have you experienced symptoms of withdrawal: No    Have you ever overdosed on any substances?: No    Are you currently using any Medication Assisted Treatment for Substance Use: No      Compulsive Behaviors:  Compulsive Behavior Information:  None reported.     Disordered Eating History:  Do you have a history of disordered eating: No      Social Determinants of Health:    SDOH:  None and other    Other SDOH:  Financial    Trauma and Abuse History:    Have you ever been abused: No      Legal History:    Have you ever been arrested  or had a DUI: No      Have you been incarcerated: No      Are you currently on parole/probation: No      Any current Children and Youth involvement: No      Any pending legal charges:  "No      Relationship History:    Current marital status:       Natural Supports:  Other and cousins    Other natural supports:  Children    Employment History    Are you currently employed: No      Currently seeking employment: No      Longest period of employment:  Retired due to injured back - worked \"for years\"    Future work goals:  N/A    Sources of income/financial support:  group home SSA and Supplemental Security Income (SSI)     History:      Status: no history of  duty  Educational History:     Have you ever been diagnosed with a learning disability: No      Highest level of education:  High school graduate    School attended/attending:  N/A    Have you ever had an IEP or 504-plan: No      Do you need assistance with reading or writing: No      Recommended Treatment:     Psychotherapy:  Individual sessions    Frequency:  2 times    Session frequency:  Monthly      Visit start and stop times:    03/04/24  Start Time: 0900  Stop Time: 1002  Total Visit Time: 62 minutes  "

## 2024-03-07 ENCOUNTER — TELEPHONE (OUTPATIENT)
Dept: PSYCHIATRY | Facility: CLINIC | Age: 59
End: 2024-03-07

## 2024-03-07 NOTE — TELEPHONE ENCOUNTER
Patient called the office wanting an update on her medication refill she requested earlier in the day, Writer was able to check the patients chart and saw nothing had been sent  to patients pharmacy and writer relayed the message that provider placed in the chart. Patient asked to speak to someone in regards to the medication. Writer offered to transfer the patient to the nurses line for assistance.Patient stated yes she would like to speak with a nurse for farther assistance.  Writer transferred patient to the nurses line.

## 2024-03-07 NOTE — TELEPHONE ENCOUNTER
Patient called office in regards to medication that was being prescribed. Xanax 2mg, patient has two days worth left, and sendig to [facility] in [place]. Upcoming appointment is 04/16/24 at 2pm. Writer informed patient the message would be sent to provider.

## 2024-03-08 ENCOUNTER — TELEPHONE (OUTPATIENT)
Dept: PSYCHIATRY | Facility: CLINIC | Age: 59
End: 2024-03-08

## 2024-03-08 NOTE — TELEPHONE ENCOUNTER
Oralia left message that she wants to speak to someone, she dose not understand why Dr. Richard will give her the mediation.

## 2024-03-08 NOTE — TELEPHONE ENCOUNTER
Patient called and stated she needed to speak to a nurse about her medications, writer transferred caller to nursing line for assistance

## 2024-03-08 NOTE — TELEPHONE ENCOUNTER
FARIBA for Oralia- returning her call regarding a medication question. Encouraged she call nursing and leave a detailed message. Provided number

## 2024-03-08 NOTE — TELEPHONE ENCOUNTER
Oralia called repeatedly today - see Telephone Encounter.    Spoke with Oralia. She said Dr. Richard knows her and she's always been on Xanax and needed pain medicine at times. She said she had knee surgery in December and right now is taking oxycodone 5 mg 3x/day or as needed.     Oralia relates she had wanted to stop her medications to see if she could do it without. Now she is feeling overwhelmed, losing sleep and not eating. Reviewed her last appointment with Dr. Richard was in May 2023 and that she will need an evaluation by Dr. Richard to resume medications. Also informed her that Xanax is meant to be short term and not to be taken as the only medication for treatment. She was not happy that Xanax would not be prescribed today.     Informed her the message will be forwarded for Dr. Richard review on RTC on Monday, 3/11/24. She has an appointment scheduled for 4/16/24. Will follow up with Oralia after review.

## 2024-03-11 NOTE — TELEPHONE ENCOUNTER
Left message for patient to return call to the office to schedule 60 minute appt with provider for new eval. Writer cancelled 4/16 appt as provider preferred to see patient for eval. Please schedule in next available in office 60 min slot. If not available may offer virtual however in office is preferred by provider.

## 2024-03-11 NOTE — TELEPHONE ENCOUNTER
Per Dr. Richard message:  Oralia will need an evaluation for medications to be prescribed.     Spoke with Oralia and reviewed above. She was accepting of same and agreed a lot has changed and she looks forward to talking with Dr. Richard.     Sylvia, please check that Oralia is on the cancellation list.     Thank you

## 2024-03-18 ENCOUNTER — TELEMEDICINE (OUTPATIENT)
Dept: BEHAVIORAL/MENTAL HEALTH CLINIC | Facility: CLINIC | Age: 59
End: 2024-03-18
Payer: COMMERCIAL

## 2024-03-18 DIAGNOSIS — F41.1 GENERALIZED ANXIETY DISORDER: ICD-10-CM

## 2024-03-18 DIAGNOSIS — F31.32 BIPOLAR AFFECTIVE DISORDER, CURRENTLY DEPRESSED, MODERATE (HCC): Primary | ICD-10-CM

## 2024-03-18 PROCEDURE — 90837 PSYTX W PT 60 MINUTES: CPT

## 2024-03-18 NOTE — PSYCH
Virtual Regular Visit    Verification of patient location:    Patient is located at Home in the following state in which I hold an active license [place]      Assessment/Plan:    Problem List Items Addressed This Visit       Bipolar disorder (HCC) - Primary    Generalized anxiety disorder       Goals addressed in session: Goal 1 and Goal 2          Reason for visit is No chief complaint on file.       Encounter provider Edel Gibson LPC    Provider located at [facility] [address] [address] [phone]      Recent Visits  No visits were found meeting these conditions.  Showing recent visits within past 7 days and meeting all other requirements  Today's Visits  Date Type Provider Dept   03/18/24 Telemedicine Edel Gibson LPC [facility] [address]   Showing today's visits and meeting all other requirements  Future Appointments  No visits were found meeting these conditions.  Showing future appointments within next 150 days and meeting all other requirements       The patient was identified by name and date of birth. Oralia Ambrocio was informed that this is a telemedicine visit and that the visit is being conducted throughthe Epic Embedded platform. She agrees to proceed..  My office door was closed. No one else was in the room.  She acknowledged consent and understanding of privacy and security of the video platform. The patient has agreed to participate and understands they can discontinue the visit at any time.    Patient is aware this is a billable service.     Subjective  Oralia Ambrocio is a 58 y.o. female .      HPI     Past Medical History:   Diagnosis Date    Hyperlipidemia     Hypertension        Past Surgical History:   Procedure Laterality Date    CHOLECYSTECTOMY      GASTRIC BYPASS         Current Outpatient Medications   Medication Sig Dispense Refill    albuterol (ACCUNEB) 1.25  MG/3ML nebulizer solution Inhale 1.25 mg every 6 (six) hours as needed      Albuterol Sulfate 108 (90 Base) MCG/ACT AEPB Inhale 2 puffs every 4 (four) hours as needed      aspirin 325 mg tablet Take 325 mg by mouth daily      Cholecalciferol (Vitamin D3) 125 MCG (5000 UT) CAPS       Cholecalciferol (Vitamin D3) 50 MCG (2000 UT) capsule       clindamycin (CLEOCIN) 150 mg capsule       ergocalciferol (VITAMIN D2) 50,000 units       gabapentin (NEURONTIN) 300 mg capsule Take 1 capsule (300 mg total) by mouth 3 (three) times a day 90 capsule 1    meclizine (ANTIVERT) 25 mg tablet Take 1 tablet (25 mg total) by mouth 3 (three) times a day as needed for dizziness 10 tablet 0    meloxicam (MOBIC) 15 mg tablet Take 1 tablet (15 mg total) by mouth daily 30 tablet 1    Multiple Vitamin (Tab-A-Herman/Beta Carotene) TABS TAKE 1 TABLET BY MOUTH DAILY      omeprazole (PriLOSEC) 20 mg delayed release capsule One capsule po daily      oxyCODONE (ROXICODONE) 5 immediate release tablet       senna-docusate sodium (SENOKOT S) 8.6-50 mg per tablet TAKE ONE TABLET BY MOUTH TWICE A DAY AS NEEDED FOR CONSTIPATION      simvastatin (ZOCOR) 20 mg tablet Take 20 mg by mouth      TiZANidine (ZANAFLEX) 4 MG capsule Take 4 mg by mouth Three times daily as needed      tiZANidine (ZANAFLEX) 4 mg tablet       tobramycin (TOBREX) 0.3 % SOLN       vitamin B-12 (VITAMIN B-12) 500 mcg tablet TAKE 1 TABLET (500 MCG TOTAL) BY MOUTH DAILY.       No current facility-administered medications for this visit.        Allergies   Allergen Reactions    Acetaminophen Hives     Pt reports rash on feet and itchy sensation tongue    Other Other (See Comments)    Penicillins      Other reaction(s): Other (See Comments)  hives, elsy. ancef 3/27/03         Review of Systems    Video Exam    There were no vitals filed for this visit.    Physical Exam     Behavioral Health Psychotherapy Progress Note    Psychotherapy Provided: Individual Psychotherapy     1. Bipolar  "affective disorder, currently depressed, moderate (HCC)        2. Generalized anxiety disorder            Goals addressed in session: Goal 1 and Goal 2     DATA:     Oralia and therapist worked on and completed the treatment plan. Oralia is to sign the treatment plan via [employer] due to the appointment being telehealth. Oralia also spoke about her thoughts of struggling with possible ADHD symptoms. She spoke about how she struggles to no be able to focus and has a bunch of \"tabs\" open in her mind. She spoke about how her children get upset with her due to her interrupting her as well as changing the subjects. Therapist provided psychoeducation in regards to this. She seemed to understand and explained that she will discuss this with her psychiatrist when she meets with her. Oralia and therapist will continue to discuss the above mentioned and progress in upcoming sessions.     During this session, this clinician used the following therapeutic modalities: Engagement Strategies, Client-centered Therapy, Cognitive Behavioral Therapy, Solution-Focused Therapy, and Supportive Psychotherapy    Substance Abuse was not addressed during this session. If the client is diagnosed with a co-occurring substance use disorder, please indicate any changes in the frequency or amount of use: N/A. Stage of change for addressing substance use diagnoses: No substance use/Not applicable    ASSESSMENT:  Oralia Ambrocio presents with a Euthymic/ normal mood.     her affect is Normal range and intensity, which is congruent, with her mood and the content of the session. The client has made progress on their goals.     Oralia Ambrocio presents with a none risk of suicide, none risk of self-harm, and none risk of harm to others.    For any risk assessment that surpasses a \"low\" rating, a safety plan must be developed.    A safety plan was indicated: no  If yes, describe in detail     PLAN: Between sessions, Oralia Ambrocio will continue " to work on the above mentioned for upcoming session. At the next session, the therapist will use Engagement Strategies, Client-centered Therapy, Cognitive Behavioral Therapy, Solution-Focused Therapy, and Supportive Psychotherapy to address her stressors.    Behavioral Health Treatment Plan and Discharge Planning: Oralia Ambrocio is aware of and agrees to continue to work on their treatment plan. They have identified and are working toward their discharge goals. yes    Visit start and stop times:    03/18/24  Start Time: 0904  Stop Time: 1002  Total Visit Time: 58 minutes

## 2024-03-18 NOTE — BH TREATMENT PLAN
"Outpatient Behavioral Health Psychotherapy Treatment Plan    Oralia Ambrocio  1965     Date of Initial Psychotherapy Assessment: 03/04/2024   Date of Current Treatment Plan: 03/18/24  Treatment Plan Target Date: 09/18/2024  Treatment Plan Expiration Date: 09/18/2024    Diagnosis:   1. Bipolar affective disorder, currently depressed, moderate (HCC)        2. Generalized anxiety disorder            Area(s) of Need: Concentration/focus    Long Term Goal 1 (in the client's own words): \" I feel like I am unable to focus/concentrate, especially when I am trying to work on my online course\"    Stage of Change: Preparation    Target Date for completion: 09/18/2024     Anticipated therapeutic modalities: Engagement Strategies, Client-centered Therapy, Cognitive Behavioral Therapy, Solution-Focused Therapy, and Supportive Psychotherapy      People identified to complete this goal: Oralia and this therapistEdel      Objective 1: (identify the means of measuring success in meeting the objective): A. Oralia will continue to attend biweekly therapy appointments, B. Oralia will work on learning at least 2-3 coping skills/ways to focus on her online course such as utilizing time management skills. C. Oralia will meet with her psychiatrist and discuss medications (if necessary) and will adhere to that routine as prescribed.     Long Term Goal 2 (in the client's own words): \" I have a hard time sleeping and I think it's related to my anxiety\"    Stage of Change: Preparation    Target Date for completion: 09/18/2024     Anticipated therapeutic modalities: Engagement Strategies, Client-centered Therapy, Cognitive Behavioral Therapy, Solution-Focused Therapy, and Supportive Psychotherapy      People identified to complete this goal: Oralia and Edel ruiz.       Objective 1: (identify the means of measuring success in meeting the objective): A. Oralia will attend biweekly therapy sessions, B. Oralia and " therapist will work on learning at least 3-4 coping skills/de-escalation skills/relaxation skills in order to help her try to relax and fall asleep. CKhalida Barton and therapist will work on making a nightly routine so she is able to relax and be able to fall asleep. MARKKhalida Barton will meet with her psychiatrist and discuss medications (if necessary) and will adhere to that routine as prescribed.       I am currently under the care of a [facility] psychiatric provider: yes    My [facility] psychiatric provider is: Dr. Belinda Richard MD.     I am currently taking psychiatric medications: No    I feel that I will be ready for discharge from mental health care when I reach the following (measurable goal/objective): When Oralia is able to apply learned skills/techniques without prompting or support.     For children and adults who have a legal guardian:   Has there been any change to custody orders and/or guardianship status? NA. If yes, attach updated documentation.    I have not yet created my Crisis Plan and have been offered a copy of this plan    Behavioral Health Treatment Plan [facility]: Diagnosis and Treatment Plan explained to Oralia Ambrocio acknowledges an understanding of their diagnosis. Oralia Ambrocio agrees to this treatment plan.    I have been offered a copy of this Treatment Plan. yes

## 2024-03-25 ENCOUNTER — TELEMEDICINE (OUTPATIENT)
Dept: PSYCHIATRY | Facility: CLINIC | Age: 59
End: 2024-03-25
Payer: COMMERCIAL

## 2024-03-25 DIAGNOSIS — F90.2 ADHD (ATTENTION DEFICIT HYPERACTIVITY DISORDER), COMBINED TYPE: ICD-10-CM

## 2024-03-25 DIAGNOSIS — F41.1 GENERALIZED ANXIETY DISORDER: ICD-10-CM

## 2024-03-25 DIAGNOSIS — F31.12 MODERATE BIPOLAR I DISORDER WITH MANIA AS CURRENT EPISODE (HCC): Primary | ICD-10-CM

## 2024-03-25 PROBLEM — Z96.652 S/P TKR (TOTAL KNEE REPLACEMENT), LEFT: Status: ACTIVE | Noted: 2023-05-30

## 2024-03-25 PROBLEM — G89.29 BILATERAL CHRONIC KNEE PAIN: Status: ACTIVE | Noted: 2023-05-30

## 2024-03-25 PROBLEM — M25.561 BILATERAL CHRONIC KNEE PAIN: Status: ACTIVE | Noted: 2023-05-30

## 2024-03-25 PROBLEM — G56.03 CARPAL TUNNEL SYNDROME, BILATERAL: Status: RESOLVED | Noted: 2020-04-27 | Resolved: 2024-03-25

## 2024-03-25 PROBLEM — M25.473 ANKLE SWELLING: Status: RESOLVED | Noted: 2020-04-27 | Resolved: 2024-03-25

## 2024-03-25 PROBLEM — M25.562 BILATERAL CHRONIC KNEE PAIN: Status: ACTIVE | Noted: 2023-05-30

## 2024-03-25 PROBLEM — F30.11: Status: ACTIVE | Noted: 2019-02-22

## 2024-03-25 PROCEDURE — 90833 PSYTX W PT W E/M 30 MIN: CPT | Performed by: PSYCHIATRY & NEUROLOGY

## 2024-03-25 PROCEDURE — 99214 OFFICE O/P EST MOD 30 MIN: CPT | Performed by: PSYCHIATRY & NEUROLOGY

## 2024-03-25 RX ORDER — DIPHENOXYLATE HYDROCHLORIDE AND ATROPINE SULFATE 2.5; .025 MG/1; MG/1
1 TABLET ORAL DAILY
COMMUNITY
Start: 2024-01-09

## 2024-03-25 RX ORDER — ATOMOXETINE 40 MG/1
40 CAPSULE ORAL DAILY
Qty: 30 CAPSULE | Refills: 2 | Status: SHIPPED | OUTPATIENT
Start: 2024-03-25

## 2024-03-25 RX ORDER — FAMOTIDINE 20 MG/1
TABLET, FILM COATED ORAL
COMMUNITY
Start: 2023-12-28

## 2024-03-25 NOTE — PSYCH
Virtual Regular Visit    Verification of patient location:    Patient is located at Home in the following state in which I hold an active license [place]      Assessment/Plan:    Problem List Items Addressed This Visit          Behavioral Health    Generalized anxiety disorder    Relevant Medications    atoMOXetine (STRATTERA) 40 mg capsule    Moderate bipolar I disorder with yg as current episode (HCC) - Primary    Relevant Medications    atoMOXetine (STRATTERA) 40 mg capsule     Other Visit Diagnoses       ADHD (attention deficit hyperactivity disorder), combined type        Relevant Medications    atoMOXetine (STRATTERA) 40 mg capsule                     Reason for visit is   No chief complaint on file.       Encounter provider Belinda Bonilla MD    Provider located at [facility] [address] [place] [address] [phone]      Recent Visits  No visits were found meeting these conditions.  Showing recent visits within past 7 days and meeting all other requirements  Today's Visits  Date Type Provider Dept   03/25/24 [facility]   Showing today's visits and meeting all other requirements  Future Appointments  No visits were found meeting these conditions.  Showing future appointments within next 150 days and meeting all other requirements       The patient was identified by name and date of birth. Oralia Ambrocio was informed that this is a telemedicine visit and that the visit is being conducted throughthe Epic Embedded platform. She agrees to proceed..  My office door was closed. No one else was in the room.  She acknowledged consent and understanding of privacy and security of the video platform. The patient has agreed to participate and understands they can discontinue the visit at any time.    Patient is aware this is a billable service.     Subjective  Oralia CHAVARRIA  "Cristóbal is a 58 y.o. female with Bipolar do .    She stated last March she had total knee replacement on the left side. She stated her recovery has been slow. She is on muscle relaxer and pain killers post surgery. She  stopped all her psychiatric medications about a year ago and she did it on her own.  She stated she also stopped her BP medication and her PCP is monitoring her condition closely. She continues aspirin, cholesterol medications and her vitamins.   She stated she has been managing her anxiety and depression using her coping skills and her el. Last seen May 2023. Since last seen she stated she went back to school to get a business degree. 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  She stated that recently she has been feeling overwhelmed and has difficulties focusing. She stated her family noticed she has been more pressured on her speech and \"all over the place\"    She denies depressed mood, sleep is affected by the stress of taking  college courses for associates degree in business.   She stated her daughter is paying for her courses.  She wants to try something to help with her attention and concentration.   Appetite is normal and energy level is high.  She in interested in starting medication for attention deficit but warned that if she has gy this could destabilize her mood. We also discussed that pain medication and muscle relaxers cause difficulties with attention and concentration.  Do not want to take medication on a daily basis.   Pending fasting labs.    Patient meets criteria for ADHD based on the following: difficulties sustaining attention in tasks, often does not seem to listen when spoken to,difficulties following instructions, difficulties finishing tasks, difficulties with organization and planning, avoidance of tasks that require mental effort, easily distracted and forgetful. The above symptoms have been present for years and have consistently " affected her abilities to function in social, academic and occupational activities.   She wants to start trial of Strattera 40 mg po qam.  Will schedule follow up in 4-6 weeks.                  HPI     Past Medical History:   Diagnosis Date    Hyperlipidemia     Hypertension        Past Surgical History:   Procedure Laterality Date    CHOLECYSTECTOMY      GASTRIC BYPASS      TOTAL KNEE ARTHROPLASTY Left        Current Outpatient Medications   Medication Sig Dispense Refill    atoMOXetine (STRATTERA) 40 mg capsule Take 1 capsule (40 mg total) by mouth daily 30 capsule 2    cyanocobalamin (VITAMIN B-12) 100 MCG tablet Take 100 mcg by mouth daily      famotidine (PEPCID) 20 mg tablet       multivitamin (THERAGRAN) TABS Take 1 tablet by mouth daily      albuterol (ACCUNEB) 1.25 MG/3ML nebulizer solution Inhale 1.25 mg every 6 (six) hours as needed      Albuterol Sulfate 108 (90 Base) MCG/ACT AEPB Inhale 2 puffs every 4 (four) hours as needed      aspirin 325 mg tablet Take 325 mg by mouth daily      Cholecalciferol (Vitamin D3) 125 MCG (5000 UT) CAPS       clindamycin (CLEOCIN) 150 mg capsule       ergocalciferol (VITAMIN D2) 50,000 units       Multiple Vitamin (Tab-A-Herman/Beta Carotene) TABS TAKE 1 TABLET BY MOUTH DAILY      oxyCODONE (ROXICODONE) 5 immediate release tablet       senna-docusate sodium (SENOKOT S) 8.6-50 mg per tablet TAKE ONE TABLET BY MOUTH TWICE A DAY AS NEEDED FOR CONSTIPATION      simvastatin (ZOCOR) 20 mg tablet Take 20 mg by mouth      tiZANidine (ZANAFLEX) 4 mg tablet       vitamin B-12 (VITAMIN B-12) 500 mcg tablet TAKE 1 TABLET (500 MCG TOTAL) BY MOUTH DAILY.       No current facility-administered medications for this visit.        Allergies   Allergen Reactions    Acetaminophen Hives     Pt reports rash on feet and itchy sensation tongue    Other Other (See Comments)    Penicillins      Other reaction(s): Other (See Comments)  hives, elsy. ancef 3/27/03         Review of Systems     Mood  Anxiety, Depression and Emotional Lability   Behavior Impulsive Behavior   Thought Content Disturbing Thoughts, Feelings   General Emotional Problems and Decreased Functioning   Personality Normal   Other Psych Symptoms Normal   Constitutional Negative   ENT Negative   Cardiovascular Negative   Respiratory Negative   Gastrointestinal Negative   Genitourinary Negative   Musculoskeletal Negative   Integumentary Negative   Neurological Negative   Endocrine Normal    Other Symptoms Normal        Laboratory Results:   Recent Labs (last 12 months):   No visits with results within 12 Month(s) from this visit.   Latest known visit with results is:   Admission on 03/05/2022, Discharged on 03/05/2022   Component Date Value    WBC 03/05/2022 5.35     RBC 03/05/2022 4.03     Hemoglobin 03/05/2022 10.3 (L)     Hematocrit 03/05/2022 34.6 (L)     MCV 03/05/2022 86     MCH 03/05/2022 25.6 (L)     MCHC 03/05/2022 29.8 (L)     RDW 03/05/2022 15.3 (H)     MPV 03/05/2022 9.6     Platelets 03/05/2022 237     nRBC 03/05/2022 0     Neutrophils Relative 03/05/2022 36 (L)     Immature Grans % 03/05/2022 0     Lymphocytes Relative 03/05/2022 52 (H)     Monocytes Relative 03/05/2022 9     Eosinophils Relative 03/05/2022 2     Basophils Relative 03/05/2022 1     Neutrophils Absolute 03/05/2022 1.93     Absolute Immature Grans 03/05/2022 0.01     Absolute Lymphocytes 03/05/2022 2.77     Absolute Monocytes 03/05/2022 0.46     Eosinophils Absolute 03/05/2022 0.13     Basophils Absolute 03/05/2022 0.05     Protime 03/05/2022 24.7 (H)     INR 03/05/2022 2.34 (H)     PTT 03/05/2022 56 (H)     Sodium 03/05/2022 145     Potassium 03/05/2022 3.9     Chloride 03/05/2022 109 (H)     CO2 03/05/2022 28     ANION GAP 03/05/2022 8     BUN 03/05/2022 16     Creatinine 03/05/2022 1.27     Glucose 03/05/2022 161 (H)     Calcium 03/05/2022 8.5     Corrected Calcium 03/05/2022 9.1     AST 03/05/2022 19     ALT 03/05/2022 21     Alkaline Phosphatase 03/05/2022 106      Total Protein 03/05/2022 7.0     Albumin 03/05/2022 3.3 (L)     Total Bilirubin 03/05/2022 0.21     eGFR 03/05/2022 47     Protime 03/05/2022 13.6     INR 03/05/2022 1.07     Ventricular Rate 03/05/2022 84     Atrial Rate 03/05/2022 84     NE Interval 03/05/2022 160     QRSD Interval 03/05/2022 86     QT Interval 03/05/2022 352     QTC Interval 03/05/2022 415     P Axis 03/05/2022 74     QRS [place] 03/05/2022 89     T Wave [place] 03/05/2022 0        Substance Abuse History:  Social History     Substance and Sexual Activity   Drug Use Not on file       Family Psychiatric History: History reviewed. No pertinent family history.    The following portions of the patient's history were reviewed and updated as appropriate: allergies, current medications, past family history, past medical history, past social history, past surgical history and problem list.    Social History     Socioeconomic History    Marital status:      Spouse name: Not on file    Number of children: Not on file    Years of education: Not on file    Highest education level: Not on file   Occupational History    Not on file   Tobacco Use    Smoking status: Former    Smokeless tobacco: Never   Substance and Sexual Activity    Alcohol use: Not Currently    Drug use: Not on file    Sexual activity: Not on file   Other Topics Concern    Not on file   Social History Narrative    Not on file     Social Determinants of Health     Financial Resource Strain: Not on file   Food Insecurity: Not on file   Transportation Needs: Not on file   Physical Activity: Not on file   Stress: Not on file   Social Connections: Not on file   Intimate Partner Violence: Not on file   Housing Stability: Not on file     Social History     Social History Narrative    Not on file       Objective:       Mental status:  Appearance calm and cooperative , adequate hygiene and grooming and good eye contact    Mood dysphoric   Affect affect was constricted   Speech a normal rate and  fluent   Thought Processes coherent/organized and normal thought processes   Hallucinations no hallucinations present    Thought Content no delusions   Abnormal Thoughts somatic preoccupation, no suicidal thoughts  and no homicidal thoughts    Orientation  oriented to person and place and time   Remote Memory short term memory intact and long term memory intact   Attention Span concentration intact   Intellect Appears to be of Average Intelligence   Insight Limited insight   Judgement judgment was limited   Muscle Strength n/a   Language no difficulty naming common objects and no difficulty repeating a phrase    Fund of Knowledge displays adequate knowledge of current events, adequate fund of knowledge regarding past history and adequate fund of knowledge regarding vocabulary                Assessment/Plan:       Diagnoses and all orders for this visit:    Moderate bipolar I disorder with yg as current episode (HCC)    Generalized anxiety disorder    ADHD (attention deficit hyperactivity disorder), combined type  -     atoMOXetine (STRATTERA) 40 mg capsule; Take 1 capsule (40 mg total) by mouth daily    Other orders  -     famotidine (PEPCID) 20 mg tablet  -     multivitamin (THERAGRAN) TABS; Take 1 tablet by mouth daily  -     cyanocobalamin (VITAMIN B-12) 100 MCG tablet; Take 100 mcg by mouth daily            Treatment Recommendations- Risks Benefits      Immediate Medical/Psychiatric/Psychotherapy Treatments and Any Precautions: as stated on HPI    Risks, Benefits And Possible Side Effects Of Medications:  {PSYCH RISK, BENEFITS AND POSSIBLE SIDE EFFECTS (Optional):24250    Controlled Medication Discussion: Discussed with patient Black Box warning on concurrent use of benzodiazepines and opioid medications including sedation, respiratory depression, coma and death. Patient understands the risk of treatment with benzodiazepines in addition to opioids and wants to continue taking those medications. , Discussed  with patient the risks of sedation, respiratory depression, impairment of ability to drive and potential for abuse and addiction related to treatment with benzodiazepine medications. The patient understands risk of treatment with benzodiazepine medications, agrees to not drive if feels impaired and agrees to take medications as prescribed. and The patient has been filling controlled prescriptions on time as prescribed to [place] Prescription Drug Monitoring program.      Psychotherapy Provided: Individual psychotherapy provided.     Individual psychotherapy provided: Yes  Counseling was provided during the session today for 16 minutes.  Medications, treatment progress and treatment plan reviewed with Oralia.  Medication education provided to Oralia.  Goals discussed during in session: continue improvement in mood stability.   Recent stressor including COVID-19 issues, family problems, family conflict, family issues, marital problems, health issues, medical problems, chronic pain, limited support, social difficulties, everyday stressors and ongoing anxiety discussed with Oralia.   Coping strategies including compliance with medications, deep/slow breathing, eliminating avoidance, engaging in previously avoided activities, exercising, getting into a good routine, improving self-esteem, increasing energy, increasing interest in usual activities, increasing motivation, increasing social interaction, keeping busy at home, maintain healthy diet, maintain heathy sleeping hygiene and maintain positive attitude reviewed with Oralia.   Importance of medication and treatment compliance reviewed with Oralia.  Educated on importance of medication and treatment compliance.  Importance of follow up with family physician for medical issues reviewed with Oralia.  Discussed with Oralia acceptance of mental illness diagnosis and need for ongoing psychiatric treatment.  Supportive therapy provided.                            Visit Time    Visit Start Time: 9:30  Visit Stop Time: 10:30  Total Visit Duration:  30 minutes

## 2024-03-28 ENCOUNTER — TELEPHONE (OUTPATIENT)
Dept: PSYCHIATRY | Facility: CLINIC | Age: 59
End: 2024-03-28

## 2024-03-28 NOTE — TELEPHONE ENCOUNTER
Left voicemail informing patient and/or parent/guardian of the Psych Encounter form needing to be signed as a requirement from the insurance company for billing purposes. Patient can access form via [employer] and sign electronically.     Please make patient aware this form must be signed for each visit as a requirement to continue future visits with provider.

## 2024-04-01 ENCOUNTER — TELEPHONE (OUTPATIENT)
Dept: PSYCHIATRY | Facility: CLINIC | Age: 59
End: 2024-04-01

## 2024-04-15 ENCOUNTER — TELEMEDICINE (OUTPATIENT)
Dept: BEHAVIORAL/MENTAL HEALTH CLINIC | Facility: CLINIC | Age: 59
End: 2024-04-15
Payer: COMMERCIAL

## 2024-04-15 DIAGNOSIS — F41.1 GENERALIZED ANXIETY DISORDER: ICD-10-CM

## 2024-04-15 DIAGNOSIS — F31.32 BIPOLAR AFFECTIVE DISORDER, CURRENTLY DEPRESSED, MODERATE (HCC): Primary | ICD-10-CM

## 2024-04-15 PROCEDURE — 90837 PSYTX W PT 60 MINUTES: CPT

## 2024-04-15 NOTE — PSYCH
Virtual Regular Visit    Verification of patient location:    Patient is located at Home in the following state in which I hold an active license [place]      Assessment/Plan:    Problem List Items Addressed This Visit       Generalized anxiety disorder     Other Visit Diagnoses       Bipolar affective disorder, currently depressed, moderate (HCC)    -  Primary            Goals addressed in session: Goal 1 and Goal 2          Reason for visit is No chief complaint on file.       Encounter provider Edel Gibson LPC    Provider located at [facility] [address] [address] [phone]      Recent Visits  No visits were found meeting these conditions.  Showing recent visits within past 7 days and meeting all other requirements  Today's Visits  Date Type Provider Dept   04/15/24 Telemedicine Edel Gibson LPC [facility] [address]   Showing today's visits and meeting all other requirements  Future Appointments  No visits were found meeting these conditions.  Showing future appointments within next 150 days and meeting all other requirements       The patient was identified by name and date of birth. Oralia Ambrocio was informed that this is a telemedicine visit and that the visit is being conducted throughthe Epic Embedded platform. She agrees to proceed..  My office door was closed. No one else was in the room.  She acknowledged consent and understanding of privacy and security of the video platform. The patient has agreed to participate and understands they can discontinue the visit at any time.    Patient is aware this is a billable service.     Subjective  Oralia Ambrocio is a 58 y.o. female .      HPI     Past Medical History:   Diagnosis Date    Hyperlipidemia     Hypertension        Past Surgical History:   Procedure Laterality Date    CHOLECYSTECTOMY      GASTRIC BYPASS      TOTAL KNEE  ARTHROPLASTY Left        Current Outpatient Medications   Medication Sig Dispense Refill    albuterol (ACCUNEB) 1.25 MG/3ML nebulizer solution Inhale 1.25 mg every 6 (six) hours as needed      Albuterol Sulfate 108 (90 Base) MCG/ACT AEPB Inhale 2 puffs every 4 (four) hours as needed      aspirin 325 mg tablet Take 325 mg by mouth daily      atoMOXetine (STRATTERA) 40 mg capsule Take 1 capsule (40 mg total) by mouth daily 30 capsule 2    Cholecalciferol (Vitamin D3) 125 MCG (5000 UT) CAPS       clindamycin (CLEOCIN) 150 mg capsule       cyanocobalamin (VITAMIN B-12) 100 MCG tablet Take 100 mcg by mouth daily      ergocalciferol (VITAMIN D2) 50,000 units       famotidine (PEPCID) 20 mg tablet       Multiple Vitamin (Tab-A-Herman/Beta Carotene) TABS TAKE 1 TABLET BY MOUTH DAILY      multivitamin (THERAGRAN) TABS Take 1 tablet by mouth daily      oxyCODONE (ROXICODONE) 5 immediate release tablet       senna-docusate sodium (SENOKOT S) 8.6-50 mg per tablet TAKE ONE TABLET BY MOUTH TWICE A DAY AS NEEDED FOR CONSTIPATION      simvastatin (ZOCOR) 20 mg tablet Take 20 mg by mouth      tiZANidine (ZANAFLEX) 4 mg tablet       vitamin B-12 (VITAMIN B-12) 500 mcg tablet TAKE 1 TABLET (500 MCG TOTAL) BY MOUTH DAILY.       No current facility-administered medications for this visit.        Allergies   Allergen Reactions    Acetaminophen Hives     Pt reports rash on feet and itchy sensation tongue    Other Other (See Comments)    Penicillins      Other reaction(s): Other (See Comments)  hives, elsy. ancef 3/27/03         Review of Systems    Video Exam    There were no vitals filed for this visit.    Physical Exam     Behavioral Health Psychotherapy Progress Note    Psychotherapy Provided: Individual Psychotherapy     1. Bipolar affective disorder, currently depressed, moderate (HCC)        2. Generalized anxiety disorder            Goals addressed in session: Goal 1 and Goal 2     DATA:     Oralia and therapist spoke about her  "current progress/stressors. She explained that she has been doing better since last session. Oralia spoke about how she still struggles with anxiety at some points of the day, but she is able to manage. She has been able to decrease her caffeine intake to only a couple cups of coffee a day, to which has lowered her anxiety symptoms. She spoke about how she has been struggling with an inability to concentrate or sit still. She presents very hyperverbal in session. Oralia spoke about how she recently met with her psychiatrist and how she wants a medication that will act fast rather than something that she needs to take for weeks. Therapist provided psychoeducation in regards to her anxiety, and what seems to be ADHD type of behaviors. Therapist explained the correlation between both diagnoses and how a \"fast acting\" medication such as adderall or ritalin will only cause more anxiety for her rather than cause it to slow down. Therapist explained that she is unsure of this is the correct diagnosis for her, but explained based on her presenting symptoms, that adding a medication such as these may only make her anxiety and hyperness worse. She appreciated this explanation and explained to therapist that she didn't feel like her psychiatrist hears her. She spoke about how she has been seeing her for some time and how she feels like she asks about her ex- more than she asks about her progress. Oralia explained that she had been seeing her psychiatrist in another practice and followed her here. She stated that when she used to have sessions with the doctor, she would speak more to her ex- as they both speak Cambodian and Oralia felt like she was just sitting there and not actually having a conversation with her doctor. Therapist suggested Oralia to have a conversation with her provider as sometimes we don't realize we are doing something incorrectly. She may have been building rapport and just asking about " "him. She explained that she isn't sure if she wants to take the medications that were prescribed to her and just focus on psychotherapy. Therapist suggested discussing her concerns about this with her provider, but if she cannot, therapist explained that she has the right to change providers if needed. She seemed to understand and will look into it. 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 Her children are going to buy the property, but Oralia has to find it and run it. They will only fund it. She spoke about how this has been a goal of hers since she was a child/young adult and would love for it to come true. She stated that she is looking to do it in [place] or [place]. Therapist wished her luck on this and to keep her posted. She agreed. Oralia and therapist will continue to discuss the above mentioned and progress in upcoming sessions.     During this session, this clinician used the following therapeutic modalities: Engagement Strategies, Client-centered Therapy, Cognitive Behavioral Therapy, Solution-Focused Therapy, and Supportive Psychotherapy    Substance Abuse was not addressed during this session. If the client is diagnosed with a co-occurring substance use disorder, please indicate any changes in the frequency or amount of use: N/A. Stage of change for addressing substance use diagnoses: No substance use/Not applicable    ASSESSMENT:  Oralia Ambrocio presents with a Euthymic/ normal mood.     her affect is Normal range and intensity, which is congruent, with her mood and the content of the session. The client has made progress on their goals.     Oralia Ambrocio presents with a none risk of suicide, none risk of self-harm, and none risk of harm to others.    For any risk assessment that surpasses a \"low\" rating, a safety plan must be developed.    A safety plan was indicated: no  If yes, describe in detail     PLAN: Between sessions, " Oralia Ambrocio will continue to work on for next session. At the next session, the therapist will use Engagement Strategies, Client-centered Therapy, Cognitive Behavioral Therapy, Solution-Focused Therapy, and Supportive Psychotherapy to address her stressors.    Behavioral Health Treatment Plan and Discharge Planning: Oralia Ambrocio is aware of and agrees to continue to work on their treatment plan. They have identified and are working toward their discharge goals. yes    Visit start and stop times:    04/15/24  Start Time: 0901  Stop Time: 1001  Total Visit Time: 60 minutes

## 2024-04-29 ENCOUNTER — TELEMEDICINE (OUTPATIENT)
Dept: BEHAVIORAL/MENTAL HEALTH CLINIC | Facility: CLINIC | Age: 59
End: 2024-04-29

## 2024-04-29 DIAGNOSIS — F31.32 BIPOLAR AFFECTIVE DISORDER, CURRENTLY DEPRESSED, MODERATE (HCC): Primary | ICD-10-CM

## 2024-04-29 DIAGNOSIS — F41.1 GENERALIZED ANXIETY DISORDER: ICD-10-CM
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
## 2024-05-01 ENCOUNTER — TELEPHONE (OUTPATIENT)
Dept: PSYCHIATRY | Facility: CLINIC | Age: 59
End: 2024-05-01

## 2024-05-01 NOTE — TELEPHONE ENCOUNTER
Patient has been added to the Medication Management wait list without a referral.    Insurance:   Insurance Type: [employer]   Commercial []   Medicaid [x]   [facility] (if applicable)   Medicare   Location Preference: [facility]  Provider Preference: no pref  Virtual: Yes [x] No []  Were outside resources sent: Yes [] No [x]

## 2024-05-01 NOTE — TELEPHONE ENCOUNTER
Patient is calling regarding cancelling an appointment.    Date/Time: 5/6/2024  at 9;30 am    Reason: patient wants a ALYSSA to a provider closer to her    Patient was rescheduled: YES [] NO [x]  If yes, when was Patient reschedule for: n/a    Patient requesting call back to reschedule: YES [] NO [x]

## 2024-05-13 ENCOUNTER — TELEMEDICINE (OUTPATIENT)
Dept: BEHAVIORAL/MENTAL HEALTH CLINIC | Facility: CLINIC | Age: 59
End: 2024-05-13

## 2024-05-13 DIAGNOSIS — F41.1 GENERALIZED ANXIETY DISORDER: ICD-10-CM

## 2024-05-13 DIAGNOSIS — F31.32 BIPOLAR AFFECTIVE DISORDER, CURRENTLY DEPRESSED, MODERATE (HCC): Primary | ICD-10-CM

## 2024-05-13 NOTE — PSYCH
Virtual Regular Visit    Verification of patient location:    Patient is located at Home in the following state in which I hold an active license [place]      Assessment/Plan:    Problem List Items Addressed This Visit       Generalized anxiety disorder     Other Visit Diagnoses       Bipolar affective disorder, currently depressed, moderate (HCC)    -  Primary            Goals addressed in session: Goal 1 and Goal 2          Reason for visit is No chief complaint on file.       Encounter provider Edel Gibson LPC      Recent Visits  No visits were found meeting these conditions.  Showing recent visits within past 7 days and meeting all other requirements  Today's Visits  Date Type Provider Dept   05/13/24 Telemedicine Edel Gibson LPC [facility] Therapist [address]   Showing today's visits and meeting all other requirements  Future Appointments  No visits were found meeting these conditions.  Showing future appointments within next 150 days and meeting all other requirements       The patient was identified by name and date of birth. Oralia Ambrocio was informed that this is a telemedicine visit and that the visit is being conducted throughthe Epic Embedded platform. She agrees to proceed..  My office door was closed. No one else was in the room.  She acknowledged consent and understanding of privacy and security of the video platform. The patient has agreed to participate and understands they can discontinue the visit at any time.    Patient is aware this is a billable service.     Subjective  Oralia Ambrocio is a 59 y.o. female .      HPI     Past Medical History:   Diagnosis Date    Hyperlipidemia     Hypertension        Past Surgical History:   Procedure Laterality Date    CHOLECYSTECTOMY      GASTRIC BYPASS      TOTAL KNEE ARTHROPLASTY Left        Current Outpatient Medications   Medication Sig Dispense Refill    albuterol (ACCUNEB) 1.25 MG/3ML nebulizer solution Inhale 1.25 mg every 6 (six) hours as  needed      Albuterol Sulfate 108 (90 Base) MCG/ACT AEPB Inhale 2 puffs every 4 (four) hours as needed      aspirin 325 mg tablet Take 325 mg by mouth daily      atoMOXetine (STRATTERA) 40 mg capsule Take 1 capsule (40 mg total) by mouth daily 30 capsule 2    Cholecalciferol (Vitamin D3) 125 MCG (5000 UT) CAPS       clindamycin (CLEOCIN) 150 mg capsule       cyanocobalamin (VITAMIN B-12) 100 MCG tablet Take 100 mcg by mouth daily      ergocalciferol (VITAMIN D2) 50,000 units       famotidine (PEPCID) 20 mg tablet       Multiple Vitamin (Tab-A-Herman/Beta Carotene) TABS TAKE 1 TABLET BY MOUTH DAILY      multivitamin (THERAGRAN) TABS Take 1 tablet by mouth daily      oxyCODONE (ROXICODONE) 5 immediate release tablet       senna-docusate sodium (SENOKOT S) 8.6-50 mg per tablet TAKE ONE TABLET BY MOUTH TWICE A DAY AS NEEDED FOR CONSTIPATION      simvastatin (ZOCOR) 20 mg tablet Take 20 mg by mouth      tiZANidine (ZANAFLEX) 4 mg tablet       vitamin B-12 (VITAMIN B-12) 500 mcg tablet TAKE 1 TABLET (500 MCG TOTAL) BY MOUTH DAILY.       No current facility-administered medications for this visit.        Allergies   Allergen Reactions    Acetaminophen Hives     Pt reports rash on feet and itchy sensation tongue    Other Other (See Comments)    Penicillins      Other reaction(s): Other (See Comments)  hives, elsy. ancef 3/27/03         Review of Systems    Video Exam    There were no vitals filed for this visit.    Physical Exam     Behavioral Health Psychotherapy Progress Note    Psychotherapy Provided: Individual Psychotherapy     1. Bipolar affective disorder, currently depressed, moderate (HCC)        2. Generalized anxiety disorder            Goals addressed in session: Goal 1 and Goal 2     DATA:     Crystal and therapist spoke about her current progress/stressors. She explained that she has been struggling with her relationship with her daughter. She spoke about how for the last 1.5 years her daughter and her daughter's  son has been living with her. She spoke about how this started as only a couple of weeks and hasn't ended since. Oralia spoke about how her daughter expects her to watch and care for her grandson. Oralia explained that she has been getting stressed out and triggered by being taken for granted. She spoke about how her daughter overstayed her welcome. She works a good job, makes decent money and doesn't use any of it for their living expenses, rather than her hair, nails, clothes, and make-up. Oralia spoke about how she has offered her daughter to give up custody of her child to take care of herself. She spoke about how she refused to do this, so she has had to set serious boundaries. Oralia and therapist spoke about ways to uphold these boundaries and continue to have structure in her home. Her daughter doesn't help pay rent and eats their food. She spoke about how she gave her a time limit and is going to hold her to that. Therapist agreed. Oralia and therapist will continue to discuss the above mentioned and progress in upcoming sessions.     During this session, this clinician used the following therapeutic modalities: Engagement Strategies, Client-centered Therapy, Cognitive Behavioral Therapy, Solution-Focused Therapy, and Supportive Psychotherapy    Substance Abuse was not addressed during this session. If the client is diagnosed with a co-occurring substance use disorder, please indicate any changes in the frequency or amount of use: N/A. Stage of change for addressing substance use diagnoses: No substance use/Not applicable    ASSESSMENT:  Oralia Ambrocio presents with a Euthymic/ normal mood.     her affect is Normal range and intensity, which is congruent, with her mood and the content of the session. The client has made progress on their goals.     Oralia Ambrocio presents with a none risk of suicide, none risk of self-harm, and none risk of harm to others.    For any risk assessment that surpasses  "a \"low\" rating, a safety plan must be developed.    A safety plan was indicated: no  If yes, describe in detail     PLAN: Between sessions, Oralia Ambrocio will continue to work on the above mentioned for next session. At the next session, the therapist will use Engagement Strategies, Client-centered Therapy, Cognitive Behavioral Therapy, Solution-Focused Therapy, and Supportive Psychotherapy to address her stressors.    Behavioral Health Treatment Plan and Discharge Planning: Oralia Ambrocio is aware of and agrees to continue to work on their treatment plan. They have identified and are working toward their discharge goals. yes    Visit start and stop times:    05/13/24  Start Time: 0906  Stop Time: 1000  Total Visit Time: 54 minutes        "

## 2024-05-15 ENCOUNTER — TELEPHONE (OUTPATIENT)
Dept: PSYCHIATRY | Facility: CLINIC | Age: 59
End: 2024-05-15

## 2024-05-15 NOTE — TELEPHONE ENCOUNTER
Spoke to patient and/or parent/guardian to make aware of the Psych Encounter form needing to be signed from recent completed appointment(s).   
no

## 2024-05-30 ENCOUNTER — TELEPHONE (OUTPATIENT)
Dept: OTHER | Facility: OTHER | Age: 59
End: 2024-05-30

## 2024-05-30 NOTE — TELEPHONE ENCOUNTER
Patient is calling regarding cancelling an appointment.    Date/Time: 05/30/2024 9 AM     Patient was rescheduled: YES [] NO [x]    Patient requesting call back to reschedule: YES [x] NO []

## 2024-06-10 ENCOUNTER — TELEMEDICINE (OUTPATIENT)
Dept: BEHAVIORAL/MENTAL HEALTH CLINIC | Facility: CLINIC | Age: 59
End: 2024-06-10
Payer: COMMERCIAL

## 2024-06-10 DIAGNOSIS — F31.32 BIPOLAR AFFECTIVE DISORDER, CURRENTLY DEPRESSED, MODERATE (HCC): Primary | ICD-10-CM

## 2024-06-10 DIAGNOSIS — F41.1 GENERALIZED ANXIETY DISORDER: ICD-10-CM

## 2024-06-10 PROCEDURE — 90837 PSYTX W PT 60 MINUTES: CPT

## 2024-06-10 NOTE — PSYCH
Virtual Regular Visit    Verification of patient location:    Patient is located at Home in the following state in which I hold an active license [place]      Assessment/Plan:    Problem List Items Addressed This Visit       Generalized anxiety disorder     Other Visit Diagnoses       Bipolar affective disorder, currently depressed, moderate (HCC)    -  Primary            Goals addressed in session: Goal 1 and Goal 2          Reason for visit is No chief complaint on file.       Encounter provider Edel Gibson LPC      Recent Visits  No visits were found meeting these conditions.  Showing recent visits within past 7 days and meeting all other requirements  Today's Visits  Date Type Provider Dept   06/10/24 Telemedicine Edel Gibson LPC [facility] Therapist [address]   Showing today's visits and meeting all other requirements  Future Appointments  No visits were found meeting these conditions.  Showing future appointments within next 150 days and meeting all other requirements       The patient was identified by name and date of birth. Oralia Ambrocio was informed that this is a telemedicine visit and that the visit is being conducted through the telephone. She agrees to proceed..  My office door was closed. No one else was in the room.  She acknowledged consent and understanding of privacy and security of the video platform. The patient has agreed to participate and understands they can discontinue the visit at any time.    Patient is aware this is a billable service.     Subjective  Oralia Ambrocio is a 59 y.o. female .      HPI     Past Medical History:   Diagnosis Date    Hyperlipidemia     Hypertension        Past Surgical History:   Procedure Laterality Date    CHOLECYSTECTOMY      GASTRIC BYPASS      TOTAL KNEE ARTHROPLASTY Left        Current Outpatient Medications   Medication Sig Dispense Refill    albuterol (ACCUNEB) 1.25 MG/3ML nebulizer solution Inhale 1.25 mg every 6 (six) hours as needed       Albuterol Sulfate 108 (90 Base) MCG/ACT AEPB Inhale 2 puffs every 4 (four) hours as needed      aspirin 325 mg tablet Take 325 mg by mouth daily      atoMOXetine (STRATTERA) 40 mg capsule Take 1 capsule (40 mg total) by mouth daily 30 capsule 2    Cholecalciferol (Vitamin D3) 125 MCG (5000 UT) CAPS       clindamycin (CLEOCIN) 150 mg capsule       cyanocobalamin (VITAMIN B-12) 100 MCG tablet Take 100 mcg by mouth daily      ergocalciferol (VITAMIN D2) 50,000 units       famotidine (PEPCID) 20 mg tablet       Multiple Vitamin (Tab-A-Herman/Beta Carotene) TABS TAKE 1 TABLET BY MOUTH DAILY      multivitamin (THERAGRAN) TABS Take 1 tablet by mouth daily      oxyCODONE (ROXICODONE) 5 immediate release tablet       senna-docusate sodium (SENOKOT S) 8.6-50 mg per tablet TAKE ONE TABLET BY MOUTH TWICE A DAY AS NEEDED FOR CONSTIPATION      simvastatin (ZOCOR) 20 mg tablet Take 20 mg by mouth      tiZANidine (ZANAFLEX) 4 mg tablet       vitamin B-12 (VITAMIN B-12) 500 mcg tablet TAKE 1 TABLET (500 MCG TOTAL) BY MOUTH DAILY.       No current facility-administered medications for this visit.        Allergies   Allergen Reactions    Acetaminophen Hives     Pt reports rash on feet and itchy sensation tongue    Other Other (See Comments)    Penicillins      Other reaction(s): Other (See Comments)  hives, elsy. ancef 3/27/03         Review of Systems    Video Exam    There were no vitals filed for this visit.    Physical Exam     Behavioral Health Psychotherapy Progress Note    Psychotherapy Provided: Individual Psychotherapy     1. Bipolar affective disorder, currently depressed, moderate (HCC)        2. Generalized anxiety disorder            Goals addressed in session: Goal 1 and Goal 2     DATA:     Crystal and therapist met via telephone due to the epic embedded platform not working during the session. She and therapist spoke about her current progress/stressors. She explained that she has been having a hard time since last  session. She spoke about how she has been wanting to move. She continues to struggle with her daughter and her daughter's son living with her. Oralia spoke about how it's been over a year that her daughter, who's a single mother, has been living with her. Oralia spoke about how she has been upset about this as she had to put serious boundaries down with her in regards to finding a new place to live. She spoke about how picky her daughter is being about the place that they will be moving to and how she has been spending her money on her hair, nails, etc. Oralia and therapist spoke about how she takes care of her grandson primarily and how her daughter hasn't been very active with him. She takes him to the park, cooks for him, puts him to bed, etc. Oralia and therapist spoke about utilizing effective communication skills to set these boundaries in regards to having her daughter move out. She gave her until mid July in regards to this. Oralia explained that she makes sure her grandson gets to the doctor as he has issues with his eyes as well as seizures. She explained the frustrations on where her daughter just doesn't prioritize him. She even offered to continue to let her grandson stay with her if she can't and won't provide enough for him. Oralia and therapist spoke about continuing to set boundaries as this will help with her stress/anxiety. She agreed and explained that she just wants to make sure her grandson is taken care of. Oralia and therapist will continue to discuss the above mentioned and progress in upcoming sessions.     During this session, this clinician used the following therapeutic modalities: Engagement Strategies, Client-centered Therapy, Cognitive Behavioral Therapy, Solution-Focused Therapy, and Supportive Psychotherapy    Substance Abuse was not addressed during this session. If the client is diagnosed with a co-occurring substance use disorder, please indicate any changes in the  "frequency or amount of use: N/A. Stage of change for addressing substance use diagnoses: No substance use/Not applicable    ASSESSMENT:  Oralia Ambrocio presents with a Euthymic/ normal mood.     her affect is Normal range and intensity, which is congruent, with her mood and the content of the session. The client has made progress on their goals.     Oralia Ambrocio presents with a none risk of suicide, none risk of self-harm, and none risk of harm to others.    For any risk assessment that surpasses a \"low\" rating, a safety plan must be developed.    A safety plan was indicated: no  If yes, describe in detail     PLAN: Between sessions, Oralia Ambrocio will continue to work on the above mentioned for next session. At the next session, the therapist will use Engagement Strategies, Client-centered Therapy, Cognitive Behavioral Therapy, Solution-Focused Therapy, and Supportive Psychotherapy to address her stressors.    Behavioral Health Treatment Plan and Discharge Planning: Oralia Ambrocio is aware of and agrees to continue to work on their treatment plan. They have identified and are working toward their discharge goals. yes    Visit start and stop times:    06/10/24  Start Time: 0902  Stop Time: 1001  Total Visit Time: 59 minutes        "

## 2024-06-13 ENCOUNTER — TELEPHONE (OUTPATIENT)
Dept: PSYCHIATRY | Facility: CLINIC | Age: 59
End: 2024-06-13

## 2024-06-13 NOTE — TELEPHONE ENCOUNTER
Left voicemail informing patient and/or parent/guardian of the Psych Encounter form needing to be signed as a requirement from the insurance company for billing purposes. Patient can access form via [employer] and sign electronically.     Please make patient aware this form must be signed for each visit as a requirement to continue future visits with provider.

## 2024-06-24 ENCOUNTER — TELEMEDICINE (OUTPATIENT)
Dept: BEHAVIORAL/MENTAL HEALTH CLINIC | Facility: CLINIC | Age: 59
End: 2024-06-24
Payer: COMMERCIAL

## 2024-06-24 DIAGNOSIS — F41.1 GENERALIZED ANXIETY DISORDER: ICD-10-CM

## 2024-06-24 DIAGNOSIS — F31.32 BIPOLAR AFFECTIVE DISORDER, CURRENTLY DEPRESSED, MODERATE (HCC): Primary | ICD-10-CM

## 2024-06-24 PROCEDURE — 90837 PSYTX W PT 60 MINUTES: CPT

## 2024-06-24 NOTE — PSYCH
Virtual Regular Visit    Verification of patient location:    Patient is located at Home in the following state in which I hold an active license [place]      Assessment/Plan:    Problem List Items Addressed This Visit       Generalized anxiety disorder     Other Visit Diagnoses       Bipolar affective disorder, currently depressed, moderate (HCC)    -  Primary            Goals addressed in session: Goal 1 and Goal 2          Reason for visit is No chief complaint on file.       Encounter provider Edel Gibson LPC      Recent Visits  No visits were found meeting these conditions.  Showing recent visits within past 7 days and meeting all other requirements  Today's Visits  Date Type Provider Dept   06/24/24 Telemedicine Edel Gibson LPC [facility] Therapist [address]   Showing today's visits and meeting all other requirements  Future Appointments  No visits were found meeting these conditions.  Showing future appointments within next 150 days and meeting all other requirements       The patient was identified by name and date of birth. Oralia Ambrocio was informed that this is a telemedicine visit and that the visit is being conducted throughthe Epic Embedded platform. She agrees to proceed..  My office door was closed. No one else was in the room.  She acknowledged consent and understanding of privacy and security of the video platform. The patient has agreed to participate and understands they can discontinue the visit at any time.    Patient is aware this is a billable service.     Subjective  Oralia Ambrocio is a 59 y.o. female .      HPI     Past Medical History:   Diagnosis Date    Hyperlipidemia     Hypertension        Past Surgical History:   Procedure Laterality Date    CHOLECYSTECTOMY      GASTRIC BYPASS      TOTAL KNEE ARTHROPLASTY Left        Current Outpatient Medications   Medication Sig Dispense Refill    albuterol (ACCUNEB) 1.25 MG/3ML nebulizer solution Inhale 1.25 mg every 6 (six) hours as  needed      Albuterol Sulfate 108 (90 Base) MCG/ACT AEPB Inhale 2 puffs every 4 (four) hours as needed      aspirin 325 mg tablet Take 325 mg by mouth daily      atoMOXetine (STRATTERA) 40 mg capsule Take 1 capsule (40 mg total) by mouth daily 30 capsule 2    Cholecalciferol (Vitamin D3) 125 MCG (5000 UT) CAPS       clindamycin (CLEOCIN) 150 mg capsule       cyanocobalamin (VITAMIN B-12) 100 MCG tablet Take 100 mcg by mouth daily      ergocalciferol (VITAMIN D2) 50,000 units       famotidine (PEPCID) 20 mg tablet       Multiple Vitamin (Tab-A-Herman/Beta Carotene) TABS TAKE 1 TABLET BY MOUTH DAILY      multivitamin (THERAGRAN) TABS Take 1 tablet by mouth daily      oxyCODONE (ROXICODONE) 5 immediate release tablet       senna-docusate sodium (SENOKOT S) 8.6-50 mg per tablet TAKE ONE TABLET BY MOUTH TWICE A DAY AS NEEDED FOR CONSTIPATION      simvastatin (ZOCOR) 20 mg tablet Take 20 mg by mouth      tiZANidine (ZANAFLEX) 4 mg tablet       vitamin B-12 (VITAMIN B-12) 500 mcg tablet TAKE 1 TABLET (500 MCG TOTAL) BY MOUTH DAILY.       No current facility-administered medications for this visit.        Allergies   Allergen Reactions    Acetaminophen Hives     Pt reports rash on feet and itchy sensation tongue    Other Other (See Comments)    Penicillins      Other reaction(s): Other (See Comments)  hives, elsy. ancef 3/27/03         Review of Systems    Video Exam    There were no vitals filed for this visit.    Physical Exam     Behavioral Health Psychotherapy Progress Note    Psychotherapy Provided: Individual Psychotherapy     1. Bipolar affective disorder, currently depressed, moderate (HCC)        2. Generalized anxiety disorder            Goals addressed in session: Goal 1 and Goal 2     DATA:     Oralia and therapist spoke about her current progress/stressors. She explained that she has been going through a lot of stress at home. She spoke about how her son was wrongly arrested for being in a grocery store and  purchasing his items. They thought he was stealing, called the police, and threw him on the ground and arrested him. In the last 2 weeks, he continues to remain in alf, even though the  proved he was innocent. Oralia spoke about how upset she was about this whole situation and how she was upset that these types of things happen in [place]. She spoke about how she's heard of them happening, but just not to someone as close to her. She explained that her son has spoken to her and called her since he's been there. The alf hasn't been very helpful/communicative when it comes to helping them out. The counselor there didn't relay Oralia's message to her son. Her son also has high blood pressure and they are only monitoring him every 90 days, which is very risky for him. Oralia explained that she is very concerned for him, but has been keeping her spirits up in regards to being there for him. Her son plans to jeanne the police department, the [employer] he got arrested at, and the [facility] alf. Oralia explained that she also continues to worry about her fiances as she is looking to move. She doesn't want to stay where she is now. She also had a cut off date for her daughter to move by July, but she found an apartment that won't be ready until September. Oralia spoke to her daughter about responsibilities and doing things around the home/saving her money to move. She agreed. Oralia also had a long talk with her in regards to her grandson and her daughter actually being a mother to him. She explained that her daughter is still  to her grandson's father and they should put their differences aside to co-parent better together. Therapist agreed. Oralia and therapist spoke about ways that she can work on focusing on what she has control of and practicing de-escalation skills as needed. She agreed. Oralia and therapist will continue to discuss the above mentioned and progress in upcoming sessions.     During this  "session, this clinician used the following therapeutic modalities: Engagement Strategies, Client-centered Therapy, Cognitive Behavioral Therapy, Solution-Focused Therapy, and Supportive Psychotherapy    Substance Abuse was not addressed during this session. If the client is diagnosed with a co-occurring substance use disorder, please indicate any changes in the frequency or amount of use: N/A. Stage of change for addressing substance use diagnoses: No substance use/Not applicable    ASSESSMENT:  Oralia Ambrocio presents with a Euthymic/ normal and Anxious mood.     her affect is Normal range and intensity, which is congruent, with her mood and the content of the session. The client has made progress on their goals.     Oralia Ambrocio presents with a none risk of suicide, none risk of self-harm, and none risk of harm to others.    For any risk assessment that surpasses a \"low\" rating, a safety plan must be developed.    A safety plan was indicated: no  If yes, describe in detail     PLAN: Between sessions, Oralia Ambrocio will continue to work on the above mentioned for next session. At the next session, the therapist will use Engagement Strategies, Client-centered Therapy, Cognitive Behavioral Therapy, Solution-Focused Therapy, and Supportive Psychotherapy to address her stressors.    Behavioral Health Treatment Plan and Discharge Planning: Oralia Ambrocio is aware of and agrees to continue to work on their treatment plan. They have identified and are working toward their discharge goals. yes    Visit start and stop times:    06/24/24  Start Time: 0901  Stop Time: 0957  Total Visit Time: 56 minutes      "

## 2024-07-08 ENCOUNTER — TELEMEDICINE (OUTPATIENT)
Dept: BEHAVIORAL/MENTAL HEALTH CLINIC | Facility: CLINIC | Age: 59
End: 2024-07-08
Payer: COMMERCIAL

## 2024-07-08 DIAGNOSIS — F41.1 GENERALIZED ANXIETY DISORDER: ICD-10-CM

## 2024-07-08 DIAGNOSIS — F31.32 BIPOLAR AFFECTIVE DISORDER, CURRENTLY DEPRESSED, MODERATE (HCC): Primary | ICD-10-CM

## 2024-07-08 PROCEDURE — 90834 PSYTX W PT 45 MINUTES: CPT

## 2024-07-08 NOTE — PSYCH
Virtual Regular Visit    Verification of patient location:    Patient is located at Home in the following state in which I hold an active license [place]      Assessment/Plan:    Problem List Items Addressed This Visit       Generalized anxiety disorder     Other Visit Diagnoses       Bipolar affective disorder, currently depressed, moderate (HCC)    -  Primary            Goals addressed in session: Goal 1 and Goal 2          Reason for visit is No chief complaint on file.       Encounter provider Edel Gibson LPC      Recent Visits  No visits were found meeting these conditions.  Showing recent visits within past 7 days and meeting all other requirements  Today's Visits  Date Type Provider Dept   07/08/24 Telemedicine Edel Gibson LPC [facility] Therapist [address]   Showing today's visits and meeting all other requirements  Future Appointments  No visits were found meeting these conditions.  Showing future appointments within next 150 days and meeting all other requirements       The patient was identified by name and date of birth. Oralia Ambrocio was informed that this is a telemedicine visit and that the visit is being conducted throughthe Epic Embedded platform. She agrees to proceed..  My office door was closed. No one else was in the room.  She acknowledged consent and understanding of privacy and security of the video platform. The patient has agreed to participate and understands they can discontinue the visit at any time.    Patient is aware this is a billable service.     Subjective  Oralia Ambrocio is a 59 y.o. female .      HPI     Past Medical History:   Diagnosis Date    Hyperlipidemia     Hypertension        Past Surgical History:   Procedure Laterality Date    CHOLECYSTECTOMY      GASTRIC BYPASS      TOTAL KNEE ARTHROPLASTY Left        Current Outpatient Medications   Medication Sig Dispense Refill    albuterol (ACCUNEB) 1.25 MG/3ML nebulizer solution Inhale 1.25 mg every 6 (six) hours as  needed      Albuterol Sulfate 108 (90 Base) MCG/ACT AEPB Inhale 2 puffs every 4 (four) hours as needed      aspirin 325 mg tablet Take 325 mg by mouth daily      atoMOXetine (STRATTERA) 40 mg capsule Take 1 capsule (40 mg total) by mouth daily 30 capsule 2    Cholecalciferol (Vitamin D3) 125 MCG (5000 UT) CAPS       clindamycin (CLEOCIN) 150 mg capsule       cyanocobalamin (VITAMIN B-12) 100 MCG tablet Take 100 mcg by mouth daily      ergocalciferol (VITAMIN D2) 50,000 units       famotidine (PEPCID) 20 mg tablet       Multiple Vitamin (Tab-A-Herman/Beta Carotene) TABS TAKE 1 TABLET BY MOUTH DAILY      multivitamin (THERAGRAN) TABS Take 1 tablet by mouth daily      oxyCODONE (ROXICODONE) 5 immediate release tablet       senna-docusate sodium (SENOKOT S) 8.6-50 mg per tablet TAKE ONE TABLET BY MOUTH TWICE A DAY AS NEEDED FOR CONSTIPATION      simvastatin (ZOCOR) 20 mg tablet Take 20 mg by mouth      tiZANidine (ZANAFLEX) 4 mg tablet       vitamin B-12 (VITAMIN B-12) 500 mcg tablet TAKE 1 TABLET (500 MCG TOTAL) BY MOUTH DAILY.       No current facility-administered medications for this visit.        Allergies   Allergen Reactions    Acetaminophen Hives     Pt reports rash on feet and itchy sensation tongue    Other Other (See Comments)    Penicillins      Other reaction(s): Other (See Comments)  hives, elsy. ancef 3/27/03         Review of Systems    Video Exam    There were no vitals filed for this visit.    Physical Exam     Behavioral Health Psychotherapy Progress Note    Psychotherapy Provided: Individual Psychotherapy     1. Bipolar affective disorder, currently depressed, moderate (HCC)        2. Generalized anxiety disorder            Goals addressed in session: Goal 1 and Goal 2     DATA:     Oralia and therapist spoke about her current progress/stressors. She spoke about how she has been struggling with her worries/anxieties in regards to getting back surgery again. Oralia explained that she had surgery years  ago and how things have been painful for her recently. She is being referred to get MRI testing done, to which she is worried about feeling claustrophobic. Oralia explained that she has been losing sleep over this due to not wanting to get such a severe surgery. Therapist and Oralia spoke about focusing on one day and step at a time. Therapist suggested Oralia to speak to her doctor about the options where she may not need surgery right away. She agreed. Therapist also discussed how important self-care activities are for her and to practice coping skills/de-escalation skills if needed. She agreed. Oralia also spoke about how she wants to move out of her current apartment, but she cannot find any comparable places. She spoke about the issues with increasing rent costs, unavailability, as well as looking for a better location. Therapist sympathized with Oralia in regards to this. Oralia explained that other than this, things have been going well family wise. She was able to spend 4th of July with her family and had a great time. She also is planning a trip to [place] with her family this summer to celebrate a birthday. Oralia and therapist will continue to discuss the above mentioned and progress in upcoming sessions.     During this session, this clinician used the following therapeutic modalities: Engagement Strategies, Client-centered Therapy, Cognitive Behavioral Therapy, Solution-Focused Therapy, and Supportive Psychotherapy    Substance Abuse was not addressed during this session. If the client is diagnosed with a co-occurring substance use disorder, please indicate any changes in the frequency or amount of use: N/A. Stage of change for addressing substance use diagnoses: No substance use/Not applicable    ASSESSMENT:  Oralia Ambrocio presents with a Euthymic/ normal mood.     her affect is Normal range and intensity, which is congruent, with her mood and the content of the session. The client  "has made progress on their goals.     Oralia Ambrocio presents with a none risk of suicide, none risk of self-harm, and none risk of harm to others.    For any risk assessment that surpasses a \"low\" rating, a safety plan must be developed.    A safety plan was indicated: no  If yes, describe in detail     PLAN: Between sessions, Oralia Ambrocio will continue to work on the above mentioned for next session. At the next session, the therapist will use Engagement Strategies, Client-centered Therapy, Cognitive Behavioral Therapy, Solution-Focused Therapy, and Supportive Psychotherapy to address her stressors.    Behavioral Health Treatment Plan and Discharge Planning: Oralia Ambrocio is aware of and agrees to continue to work on their treatment plan. They have identified and are working toward their discharge goals. yes    Visit start and stop times:    07/08/24  Start Time: 0902  Stop Time: 0951  Total Visit Time: 49 minutes        "

## 2024-07-12 ENCOUNTER — TELEPHONE (OUTPATIENT)
Dept: PSYCHIATRY | Facility: CLINIC | Age: 59
End: 2024-07-12

## 2024-07-12 NOTE — TELEPHONE ENCOUNTER
Left voicemail informing patient and/or parent/guardian of the Psych Encounter form needing to be signed as a requirement from the insurance company for billing purposes. Patient can access form via [employer] and sign electronically.     Please make patient aware this form must be signed for each visit as a requirement to continue future visits with provider.    Encounter form 7/8

## 2024-07-23 ENCOUNTER — TELEPHONE (OUTPATIENT)
Dept: BEHAVIORAL/MENTAL HEALTH CLINIC | Facility: CLINIC | Age: 59
End: 2024-07-23

## 2024-07-23 NOTE — TELEPHONE ENCOUNTER
Left voicemail informing patient and/or parent/guardian of the Psych Encounter form needing to be signed as a requirement from the insurance company for billing purposes. Patient can access form via [employer] and sign electronically.     Please make patient aware this form must be signed for each visit as a requirement to continue future visits with provider.

## 2024-07-26 ENCOUNTER — TELEPHONE (OUTPATIENT)
Dept: PSYCHIATRY | Facility: CLINIC | Age: 59
End: 2024-07-26

## 2024-08-05 ENCOUNTER — TELEMEDICINE (OUTPATIENT)
Dept: BEHAVIORAL/MENTAL HEALTH CLINIC | Facility: CLINIC | Age: 59
End: 2024-08-05
Payer: COMMERCIAL

## 2024-08-05 DIAGNOSIS — F31.32 BIPOLAR AFFECTIVE DISORDER, CURRENTLY DEPRESSED, MODERATE (HCC): Primary | ICD-10-CM

## 2024-08-05 DIAGNOSIS — F41.1 GENERALIZED ANXIETY DISORDER: ICD-10-CM

## 2024-08-05 PROCEDURE — 90834 PSYTX W PT 45 MINUTES: CPT

## 2024-08-05 NOTE — PSYCH
Virtual Regular Visit    Verification of patient location:    Patient is located at Home in the following state in which I hold an active license [place]      Assessment/Plan:    Problem List Items Addressed This Visit       Generalized anxiety disorder     Other Visit Diagnoses       Bipolar affective disorder, currently depressed, moderate (HCC)    -  Primary            Goals addressed in session: Goal 1 and Goal 2          Reason for visit is No chief complaint on file.       Encounter provider Edel Gibson LPC      Recent Visits  No visits were found meeting these conditions.  Showing recent visits within past 7 days and meeting all other requirements  Today's Visits  Date Type Provider Dept   08/05/24 Telemedicine Edel Gibson LPC [facility] Therapist [address]   Showing today's visits and meeting all other requirements  Future Appointments  No visits were found meeting these conditions.  Showing future appointments within next 150 days and meeting all other requirements       The patient was identified by name and date of birth. Oralia Ambrocio was informed that this is a telemedicine visit and that the visit is being conducted throughthe Epic Embedded platform. She agrees to proceed..  My office door was closed. No one else was in the room.  She acknowledged consent and understanding of privacy and security of the video platform. The patient has agreed to participate and understands they can discontinue the visit at any time.    Patient is aware this is a billable service.     Subjective  Oralia Ambrocio is a 59 y.o. female .      HPI     Past Medical History:   Diagnosis Date    Hyperlipidemia     Hypertension        Past Surgical History:   Procedure Laterality Date    CHOLECYSTECTOMY      GASTRIC BYPASS      TOTAL KNEE ARTHROPLASTY Left        Current Outpatient Medications   Medication Sig Dispense Refill    albuterol (ACCUNEB) 1.25 MG/3ML nebulizer solution Inhale 1.25 mg every 6 (six) hours as  needed      Albuterol Sulfate 108 (90 Base) MCG/ACT AEPB Inhale 2 puffs every 4 (four) hours as needed      aspirin 325 mg tablet Take 325 mg by mouth daily      atoMOXetine (STRATTERA) 40 mg capsule Take 1 capsule (40 mg total) by mouth daily 30 capsule 2    Cholecalciferol (Vitamin D3) 125 MCG (5000 UT) CAPS       clindamycin (CLEOCIN) 150 mg capsule       cyanocobalamin (VITAMIN B-12) 100 MCG tablet Take 100 mcg by mouth daily      ergocalciferol (VITAMIN D2) 50,000 units       famotidine (PEPCID) 20 mg tablet       Multiple Vitamin (Tab-A-Herman/Beta Carotene) TABS TAKE 1 TABLET BY MOUTH DAILY      multivitamin (THERAGRAN) TABS Take 1 tablet by mouth daily      oxyCODONE (ROXICODONE) 5 immediate release tablet       senna-docusate sodium (SENOKOT S) 8.6-50 mg per tablet TAKE ONE TABLET BY MOUTH TWICE A DAY AS NEEDED FOR CONSTIPATION      simvastatin (ZOCOR) 20 mg tablet Take 20 mg by mouth      tiZANidine (ZANAFLEX) 4 mg tablet       vitamin B-12 (VITAMIN B-12) 500 mcg tablet TAKE 1 TABLET (500 MCG TOTAL) BY MOUTH DAILY.       No current facility-administered medications for this visit.        Allergies   Allergen Reactions    Acetaminophen Hives     Pt reports rash on feet and itchy sensation tongue    Other Other (See Comments)    Penicillins      Other reaction(s): Other (See Comments)  hives, elsy. ancef 3/27/03         Review of Systems    Video Exam    There were no vitals filed for this visit.    Physical Exam     Behavioral Health Psychotherapy Progress Note    Psychotherapy Provided: Individual Psychotherapy     1. Bipolar affective disorder, currently depressed, moderate (HCC)        2. Generalized anxiety disorder            Goals addressed in session: Goal 1 and Goal 2     DATA:     Oralia and therapist spoke about her current progress/stressors. She explained that she has been struggling with anxiety over the last few weeks. She spoke about a trip she went on and how she spent a little too much money.  She isn't behind on her bills, but paid them late and was upset by this as she has been trying to catch up since coming back. Oralia spoke about how this has been giving her racing thoughts, as well as feelings of panic. Therapist and Oralia reasoned through her anxiety as well as coping skills to apply to help de-escalate them. She agreed and explained that she felt better talking through it. Oralia also spoke about a friend that has been having some issues with her special needs child. She spoke about how she has been trying to help the family get services as Oralia has experience with this due to her grandson being special needs. She explained that she feels bad for the family, but they need to work harder to get him help and take care of him. Therapist agreed. Lastly, Oralia spoke about how she has been having anxiety about getting a jury duty letter to attend to. Oralia spoke about how she doesn't feel like she can do this and explained the reasons why. She asked therapist to write a letter for an excuse for her, to which this therapist agreed and can do. Oralia and therapist will continue to discuss the above mentioned and progress in upcoming sessions.     During this session, this clinician used the following therapeutic modalities: Engagement Strategies, Client-centered Therapy, Cognitive Behavioral Therapy, Solution-Focused Therapy, and Supportive Psychotherapy    Substance Abuse was not addressed during this session. If the client is diagnosed with a co-occurring substance use disorder, please indicate any changes in the frequency or amount of use: N/A. Stage of change for addressing substance use diagnoses: No substance use/Not applicable    ASSESSMENT:  Oralia Ambrocio presents with a Euthymic/ normal mood.     her affect is Normal range and intensity, which is congruent, with her mood and the content of the session. The client has made progress on their goals.     Oralia Ambrocio presents with  "a none risk of suicide, none risk of self-harm, and none risk of harm to others.    For any risk assessment that surpasses a \"low\" rating, a safety plan must be developed.    A safety plan was indicated: no  If yes, describe in detail     PLAN: Between sessions, Oralia Ambrocio will continue to work on the above mentioned for next session. At the next session, the therapist will use Engagement Strategies, Client-centered Therapy, Cognitive Behavioral Therapy, Solution-Focused Therapy, and Supportive Psychotherapy to address her stressors.    Behavioral Health Treatment Plan and Discharge Planning: Oralia Ambrocio is aware of and agrees to continue to work on their treatment plan. They have identified and are working toward their discharge goals. yes    Visit start and stop times:    08/05/24  Start Time: 0903  Stop Time: 0954  Total Visit Time: 51 minutes        "

## 2024-08-19 ENCOUNTER — TELEMEDICINE (OUTPATIENT)
Dept: BEHAVIORAL/MENTAL HEALTH CLINIC | Facility: CLINIC | Age: 59
End: 2024-08-19
Payer: COMMERCIAL

## 2024-08-19 DIAGNOSIS — F41.1 GENERALIZED ANXIETY DISORDER: ICD-10-CM

## 2024-08-19 DIAGNOSIS — F31.32 BIPOLAR AFFECTIVE DISORDER, CURRENTLY DEPRESSED, MODERATE (HCC): Primary | ICD-10-CM

## 2024-08-19 PROCEDURE — 90837 PSYTX W PT 60 MINUTES: CPT

## 2024-08-19 NOTE — PSYCH
Virtual Regular Visit    Verification of patient location:    Patient is located at Home in the following state in which I hold an active license [place]      Assessment/Plan:    Problem List Items Addressed This Visit       Generalized anxiety disorder     Other Visit Diagnoses       Bipolar affective disorder, currently depressed, moderate (HCC)    -  Primary            Goals addressed in session: Goal 1 and Goal 2          Reason for visit is No chief complaint on file.       Encounter provider Edel Gibson LPC      Recent Visits  No visits were found meeting these conditions.  Showing recent visits within past 7 days and meeting all other requirements  Today's Visits  Date Type Provider Dept   08/19/24 Telemedicine Edel Gibson LPC [facility] Therapist [address]   Showing today's visits and meeting all other requirements  Future Appointments  No visits were found meeting these conditions.  Showing future appointments within next 150 days and meeting all other requirements       The patient was identified by name and date of birth. Oralia Ambrocio was informed that this is a telemedicine visit and that the visit is being conducted throughthe Epic Embedded platform. She agrees to proceed..  My office door was closed. No one else was in the room.  She acknowledged consent and understanding of privacy and security of the video platform. The patient has agreed to participate and understands they can discontinue the visit at any time.    Patient is aware this is a billable service.     Subjective  Oralia Ambrocio is a 59 y.o. female .      HPI     Past Medical History:   Diagnosis Date    Hyperlipidemia     Hypertension        Past Surgical History:   Procedure Laterality Date    CHOLECYSTECTOMY      GASTRIC BYPASS      TOTAL KNEE ARTHROPLASTY Left        Current Outpatient Medications   Medication Sig Dispense Refill    albuterol (ACCUNEB) 1.25 MG/3ML nebulizer solution Inhale 1.25 mg every 6 (six) hours as  needed      Albuterol Sulfate 108 (90 Base) MCG/ACT AEPB Inhale 2 puffs every 4 (four) hours as needed      aspirin 325 mg tablet Take 325 mg by mouth daily      atoMOXetine (STRATTERA) 40 mg capsule Take 1 capsule (40 mg total) by mouth daily 30 capsule 2    Cholecalciferol (Vitamin D3) 125 MCG (5000 UT) CAPS       clindamycin (CLEOCIN) 150 mg capsule       cyanocobalamin (VITAMIN B-12) 100 MCG tablet Take 100 mcg by mouth daily      ergocalciferol (VITAMIN D2) 50,000 units       famotidine (PEPCID) 20 mg tablet       Multiple Vitamin (Tab-A-Herman/Beta Carotene) TABS TAKE 1 TABLET BY MOUTH DAILY      multivitamin (THERAGRAN) TABS Take 1 tablet by mouth daily      oxyCODONE (ROXICODONE) 5 immediate release tablet       senna-docusate sodium (SENOKOT S) 8.6-50 mg per tablet TAKE ONE TABLET BY MOUTH TWICE A DAY AS NEEDED FOR CONSTIPATION      simvastatin (ZOCOR) 20 mg tablet Take 20 mg by mouth      tiZANidine (ZANAFLEX) 4 mg tablet       vitamin B-12 (VITAMIN B-12) 500 mcg tablet TAKE 1 TABLET (500 MCG TOTAL) BY MOUTH DAILY.       No current facility-administered medications for this visit.        Allergies   Allergen Reactions    Acetaminophen Hives     Pt reports rash on feet and itchy sensation tongue    Other Other (See Comments)    Penicillins      Other reaction(s): Other (See Comments)  hives, elsy. ancef 3/27/03         Review of Systems    Video Exam    There were no vitals filed for this visit.    Physical Exam     Behavioral Health Psychotherapy Progress Note    Psychotherapy Provided: Individual Psychotherapy     1. Bipolar affective disorder, currently depressed, moderate (HCC)        2. Generalized anxiety disorder            Goals addressed in session: Goal 1 and Goal 2     DATA:     Oralia and therapist spoke about her current progress/stressors. She explained that she has been having continued struggles with her daughter. She spoke about how her daughter was supposed to be moving, but the apartment  she was moving to fell through. Oralia spoke about how many issues she has had with her daughter and her lack of parenting her son. Oralia spoke about how she needs her to move out ASAP, but continues to have a hard time establishing a boundary with her. Therapist and Oralia spoke about ways to work through this and utilizing effective communication skills to discuss this. She agreed. She and therapist came up with a date where she needs her out by to be able to resume her life. Oralia was able to identify that she has been lenient with her daughter and needs to be able to be more stricter on her. Therapist agreed. She stated that she continues to take care of her daughter's child due to her daughter being an absent mother. She spoke about how much she loves and cares for him and how, if necessary, she will take custody and get child support for him. Oralia spoke about how her daughter and her daughter's partner who she had the baby with are both selfish and immature parents. She stated that in her 30's, she thought her daughter would've matured, but hasn't. Oralia explained that she feels like she was taken advantage of in regards to her housing as well as childcare. Oralia explained that this is it, she needs to stick to the date she picked and hold her daughter accountable. Therapist agreed. Oralia and therapist will continue to discuss the above mentioned and progress in upcoming sessions.     During this session, this clinician used the following therapeutic modalities: Engagement Strategies, Client-centered Therapy, Cognitive Behavioral Therapy, Solution-Focused Therapy, and Supportive Psychotherapy    Substance Abuse was not addressed during this session. If the client is diagnosed with a co-occurring substance use disorder, please indicate any changes in the frequency or amount of use: N/A. Stage of change for addressing substance use diagnoses: No substance use/Not applicable    ASSESSMENT:  Oralia  "DEON Ambrocio presents with a Euthymic/ normal mood.     her affect is Normal range and intensity, which is congruent, with her mood and the content of the session. The client has made progress on their goals.     Oralia Ambrocio presents with a none risk of suicide, none risk of self-harm, and none risk of harm to others.    For any risk assessment that surpasses a \"low\" rating, a safety plan must be developed.    A safety plan was indicated: no  If yes, describe in detail     PLAN: Between sessions, Oralia Ambrocio will continue to work on the above mentioned for next session. At the next session, the therapist will use Engagement Strategies, Client-centered Therapy, Cognitive Behavioral Therapy, Solution-Focused Therapy, and Supportive Psychotherapy to address her stressors.    Behavioral Health Treatment Plan and Discharge Planning: Oralia Ambrocio is aware of and agrees to continue to work on their treatment plan. They have identified and are working toward their discharge goals. yes    Visit start and stop times:    08/19/24  Start Time: 0901  Stop Time: 1004  Total Visit Time: 63 minutes        "

## 2024-09-23 ENCOUNTER — TELEPHONE (OUTPATIENT)
Dept: PSYCHIATRY | Facility: CLINIC | Age: 59
End: 2024-09-23

## 2024-09-23 ENCOUNTER — TELEPHONE (OUTPATIENT)
Age: 59
End: 2024-09-23

## 2024-09-23 NOTE — TELEPHONE ENCOUNTER
Writer contacted patient to schedule a ALYSSA with Dr Coffman, patient stated that she has a lot going on at this time, and will give us a call back to schedule with Dr Coffman once she is ready.

## 2024-09-27 ENCOUNTER — TELEPHONE (OUTPATIENT)
Age: 59
End: 2024-09-27

## 2024-09-27 NOTE — TELEPHONE ENCOUNTER
Patient called sharing received a text message to verify appt in person. Patient called as has appts virtually. Writer verified pt information and shared appt 9/30 is virtually. Pt understood.

## 2024-09-30 ENCOUNTER — TELEMEDICINE (OUTPATIENT)
Dept: BEHAVIORAL/MENTAL HEALTH CLINIC | Facility: CLINIC | Age: 59
End: 2024-09-30
Payer: COMMERCIAL

## 2024-09-30 DIAGNOSIS — F41.1 GENERALIZED ANXIETY DISORDER: ICD-10-CM

## 2024-09-30 DIAGNOSIS — F31.32 BIPOLAR AFFECTIVE DISORDER, CURRENTLY DEPRESSED, MODERATE (HCC): Primary | ICD-10-CM

## 2024-09-30 PROCEDURE — 90837 PSYTX W PT 60 MINUTES: CPT

## 2024-09-30 NOTE — BH TREATMENT PLAN
"Outpatient Behavioral Health Psychotherapy Treatment Plan    Oralia Ambrocio  1965     Date of Initial Psychotherapy Assessment: 03/04/2024   Date of Current Treatment Plan: 09/30/24  Treatment Plan Target Date: 03/30/2025  Treatment Plan Expiration Date: 03/30/2025    Diagnosis:   1. Bipolar affective disorder, currently depressed, moderate (HCC)        2. Generalized anxiety disorder            Area(s) of Need: Concentration/Focus    Long Term Goal 1 (in the client's own words): \" I feel like I am unable to focus/concentrate\"    Stage of Change: Action    Target Date for completion: 03/30/2025     Anticipated therapeutic modalities: Engagement Strategies, Client-centered Therapy, Cognitive Behavioral Therapy, Solution-Focused Therapy, and Supportive Psychotherapy      People identified to complete this goal: Oralia and this therapistEdel      Objective 1: (identify the means of measuring success in meeting the objective): A. Oralia will continue to attend biweekly therapy appointments, B. Oralia will continue to work on learning at least 2-3 coping skills/ways to focus on her online course such as utilizing time management skills. C. Oralia will continue to meet with her psychiatrist and discuss medications (if necessary) and will adhere to that routine as prescribed.     09/30/2024 Update: Oralia was able to finish the online course. She states that she continues to focus/concentrate at this time. This is will be a continued goal.     Long Term Goal 2 (in the client's own words): \" I have a hard time sleeping and I think it's related to my anxiety\"    Stage of Change: Action    Target Date for completion: 03/30/2025     Anticipated therapeutic modalities: Engagement Strategies, Client-centered Therapy, Cognitive Behavioral Therapy, Solution-Focused Therapy, and Supportive Psychotherapy      People identified to complete this goal: Oralia and this therapistEdel.       Objective 1: (identify the " means of measuring success in meeting the objective): A. Oralia will continue to attend biweekly therapy sessions, B. Oralia and therapist will continue to work on learning at least 3-4 coping skills/de-escalation skills/relaxation skills in order to help her try to relax and fall asleep. C. Oralia and therapist will continue to work on making a nightly routine so she is able to relax and be able to fall asleep. D. Oralia will continue meet with her psychiatrist and discuss medications (if necessary) and will adhere to that routine as prescribed.       09/30/2024 Update: Oralia reports sleeping a little better, she still wakes up after an hour or two. She has been able to cut back using the TV as well as not drinking too much coffee. She would like to continue this goal.     I am currently under the care of a [facility] psychiatric provider: yes    My [facility] psychiatric provider is: Currently working on a transfer of care (ALYSSA).    I am currently taking psychiatric medications: No    I feel that I will be ready for discharge from mental health care when I reach the following (measurable goal/objective): When Oralia is able to apply learned skills/techniques without prompting or support.     For children and adults who have a legal guardian:   Has there been any change to custody orders and/or guardianship status? NA. If yes, attach updated documentation.    I have not yet created my Crisis Plan and have been offered a copy of this plan    Behavioral Health Treatment Plan [facility]: Diagnosis and Treatment Plan explained to Oralia Ambrocio acknowledges an understanding of their diagnosis. Oralia Ambrocio agrees to this treatment plan.    I have been offered a copy of this Treatment Plan. yes

## 2024-09-30 NOTE — PSYCH
Virtual Regular Visit    Verification of patient location:    Patient is located at Home in the following state in which I hold an active license [place]      Assessment/Plan:    Problem List Items Addressed This Visit       Generalized anxiety disorder     Other Visit Diagnoses       Bipolar affective disorder, currently depressed, moderate (HCC)    -  Primary            Goals addressed in session: Goal 1 and Goal 2          Reason for visit is No chief complaint on file.       Encounter provider Edel Gibson LPC      Recent Visits  No visits were found meeting these conditions.  Showing recent visits within past 7 days and meeting all other requirements  Today's Visits  Date Type Provider Dept   09/30/24 Telemedicine Edel Gibson LPC [facility] Therapist [address]   Showing today's visits and meeting all other requirements  Future Appointments  No visits were found meeting these conditions.  Showing future appointments within next 150 days and meeting all other requirements       The patient was identified by name and date of birth. Oralia Ambrocio was informed that this is a telemedicine visit and that the visit is being conducted throughthe Epic Embedded platform. She agrees to proceed..  My office door was closed. No one else was in the room.  She acknowledged consent and understanding of privacy and security of the video platform. The patient has agreed to participate and understands they can discontinue the visit at any time.    Patient is aware this is a billable service.     Subjective  Oralia Ambrocio is a 59 y.o. female .      HPI     Past Medical History:   Diagnosis Date    Anxiety     Bipolar disorder (HCC)     Chronic pain disorder     History of Rin-en-Y gastric bypass     Hyperlipidemia     Hypertension     Opioid dependence in remission (HCC)     Panic attack     Sleep difficulties        Past Surgical History:   Procedure Laterality Date    CHOLECYSTECTOMY      GASTRIC BYPASS      TOTAL  KNEE ARTHROPLASTY Left        Current Outpatient Medications   Medication Sig Dispense Refill    albuterol (ACCUNEB) 1.25 MG/3ML nebulizer solution Inhale 1.25 mg every 6 (six) hours as needed      Albuterol Sulfate 108 (90 Base) MCG/ACT AEPB Inhale 2 puffs every 4 (four) hours as needed      aspirin 325 mg tablet Take 325 mg by mouth daily      atoMOXetine (STRATTERA) 40 mg capsule Take 1 capsule (40 mg total) by mouth daily 30 capsule 2    Cholecalciferol (Vitamin D3) 125 MCG (5000 UT) CAPS       clindamycin (CLEOCIN) 150 mg capsule       cyanocobalamin (VITAMIN B-12) 100 MCG tablet Take 100 mcg by mouth daily      ergocalciferol (VITAMIN D2) 50,000 units       famotidine (PEPCID) 20 mg tablet       Multiple Vitamin (Tab-A-Herman/Beta Carotene) TABS TAKE 1 TABLET BY MOUTH DAILY      multivitamin (THERAGRAN) TABS Take 1 tablet by mouth daily      oxyCODONE (ROXICODONE) 5 immediate release tablet       senna-docusate sodium (SENOKOT S) 8.6-50 mg per tablet TAKE ONE TABLET BY MOUTH TWICE A DAY AS NEEDED FOR CONSTIPATION      simvastatin (ZOCOR) 20 mg tablet Take 20 mg by mouth      tiZANidine (ZANAFLEX) 4 mg tablet       vitamin B-12 (VITAMIN B-12) 500 mcg tablet TAKE 1 TABLET (500 MCG TOTAL) BY MOUTH DAILY.       No current facility-administered medications for this visit.        Allergies   Allergen Reactions    Acetaminophen Hives     Pt reports rash on feet and itchy sensation tongue    Other Other (See Comments)    Penicillins      Other reaction(s): Other (See Comments)  hives, elsy. ancef 3/27/03         Review of Systems    Video Exam    There were no vitals filed for this visit.    Physical Exam     Behavioral Health Psychotherapy Progress Note    Psychotherapy Provided: Individual Psychotherapy     1. Bipolar affective disorder, currently depressed, moderate (HCC)        2. Generalized anxiety disorder            Goals addressed in session: Goal 1 and Goal 2     DATA:     Crystal and therapist worked on and  "completed the treatment plan. Oralia will sign the treatment plan via [employer] due to the appointment being telehealth. She also spoke about her current progress/stressors. She explained that she continues to have issues with her daughter and trying to get her to move. Oralia explained that there was a verbal altercation that happened with her daughter due to her \"mouthing off\" to her via text message. Oralia explained that she just sent a message trying to help her daughter find a place to move to to live as she is very picky. Her daughter responded very nasty and rude/disrespectfully towards her. Oralia had a whole discussion with her daughter and discussed how her living with Oralia was only supposed to be for a couple months, not a couple years. Her daughter had a trip planned and was going to spend the money elsewhere, where Oralia reminded her that she has a eviction date she needs to leave by. Oralia and therapist spoke about sticking to the plan and keeping boundaries. She agreed. She and therapist also spoke about continued effective communication skills to apply in this situation. Her daughter was able to apologize, but not before she hid in her room and made it awkward for hours. Oralia explained that she still helps out with her grandchild, but has been making stricter boundaries with her daughter due to this issue. She is hoping she moves shortly. Lastly, Oralia spoke about how the last few weeks she has been in [place] and in [place]. She spoke about the good times she has been having and how she didn't want to come back home. She stated that it was a nice time to get away and relax. Therapist agreed. Oralia and therapist will continue to discuss the above mentioned and progress in upcoming sessions.     During this session, this clinician used the following therapeutic modalities: Engagement Strategies, Client-centered Therapy, Cognitive Behavioral Therapy, Solution-Focused Therapy, and " "Supportive Psychotherapy    Substance Abuse was not addressed during this session. If the client is diagnosed with a co-occurring substance use disorder, please indicate any changes in the frequency or amount of use: N/A. Stage of change for addressing substance use diagnoses: No substance use/Not applicable    ASSESSMENT:  Oralia Ambrocio presents with a Euthymic/ normal mood.     her affect is Normal range and intensity, which is congruent, with her mood and the content of the session. The client has made progress on their goals.     Oralia Ambrocio presents with a none risk of suicide, none risk of self-harm, and none risk of harm to others.    For any risk assessment that surpasses a \"low\" rating, a safety plan must be developed.    A safety plan was indicated: no  If yes, describe in detail     PLAN: Between sessions, Oralia Ambrocio will continue to work on the above mentioned for next session. At the next session, the therapist will use Engagement Strategies, Client-centered Therapy, Cognitive Behavioral Therapy, Solution-Focused Therapy, and Supportive Psychotherapy to address her stressors.    Behavioral Health Treatment Plan and Discharge Planning: Oralia Ambrocio is aware of and agrees to continue to work on their treatment plan. They have identified and are working toward their discharge goals. yes    Visit start and stop times:    09/30/24  Start Time: 0905  Stop Time: 0959  Total Visit Time: 54 minutes        "

## 2024-10-03 ENCOUNTER — TELEPHONE (OUTPATIENT)
Dept: PSYCHIATRY | Facility: CLINIC | Age: 59
End: 2024-10-03

## 2024-10-23 ENCOUNTER — TELEMEDICINE (OUTPATIENT)
Dept: BEHAVIORAL/MENTAL HEALTH CLINIC | Facility: CLINIC | Age: 59
End: 2024-10-23
Payer: COMMERCIAL

## 2024-10-23 ENCOUNTER — TELEPHONE (OUTPATIENT)
Dept: PSYCHIATRY | Facility: CLINIC | Age: 59
End: 2024-10-23

## 2024-10-23 DIAGNOSIS — F41.1 GENERALIZED ANXIETY DISORDER: ICD-10-CM

## 2024-10-23 DIAGNOSIS — F31.32 BIPOLAR AFFECTIVE DISORDER, CURRENTLY DEPRESSED, MODERATE (HCC): Primary | ICD-10-CM

## 2024-10-23 PROCEDURE — 90832 PSYTX W PT 30 MINUTES: CPT

## 2024-10-23 NOTE — PSYCH
Virtual Regular Visit    Verification of patient location:    Patient is located at Home in the following state in which I hold an active license [place]      Assessment/Plan:    Problem List Items Addressed This Visit       Generalized anxiety disorder     Other Visit Diagnoses       Bipolar affective disorder, currently depressed, moderate (HCC)    -  Primary            Goals addressed in session: Goal 1 and Goal 2          Reason for visit is No chief complaint on file.       Encounter provider Edel Gibson LPC      Recent Visits  No visits were found meeting these conditions.  Showing recent visits within past 7 days and meeting all other requirements  Today's Visits  Date Type Provider Dept   10/23/24 Telemedicine Edel Gibson LPC [facility] Therapist [address]   Showing today's visits and meeting all other requirements  Future Appointments  No visits were found meeting these conditions.  Showing future appointments within next 150 days and meeting all other requirements       The patient was identified by name and date of birth. Oralia Ambrocio was informed that this is a telemedicine visit and that the visit is being conducted throughthe Epic Embedded platform. She agrees to proceed..  My office door was closed. No one else was in the room.  She acknowledged consent and understanding of privacy and security of the video platform. The patient has agreed to participate and understands they can discontinue the visit at any time.    Patient is aware this is a billable service.     Subjective  Oralia Ambrocio is a 59 y.o. female .      HPI     Past Medical History:   Diagnosis Date    Anxiety     Bipolar disorder (HCC)     Chronic pain disorder     History of Rin-en-Y gastric bypass     Hyperlipidemia     Hypertension     Opioid dependence in remission (HCC)     Panic attack     Sleep difficulties        Past Surgical History:   Procedure Laterality Date    CHOLECYSTECTOMY      GASTRIC BYPASS      TOTAL  KNEE ARTHROPLASTY Left        Current Outpatient Medications   Medication Sig Dispense Refill    albuterol (ACCUNEB) 1.25 MG/3ML nebulizer solution Inhale 1.25 mg every 6 (six) hours as needed      Albuterol Sulfate 108 (90 Base) MCG/ACT AEPB Inhale 2 puffs every 4 (four) hours as needed      aspirin 325 mg tablet Take 325 mg by mouth daily      atoMOXetine (STRATTERA) 40 mg capsule Take 1 capsule (40 mg total) by mouth daily 30 capsule 2    Cholecalciferol (Vitamin D3) 125 MCG (5000 UT) CAPS       clindamycin (CLEOCIN) 150 mg capsule       cyanocobalamin (VITAMIN B-12) 100 MCG tablet Take 100 mcg by mouth daily      ergocalciferol (VITAMIN D2) 50,000 units       famotidine (PEPCID) 20 mg tablet       Multiple Vitamin (Tab-A-Herman/Beta Carotene) TABS TAKE 1 TABLET BY MOUTH DAILY      multivitamin (THERAGRAN) TABS Take 1 tablet by mouth daily      oxyCODONE (ROXICODONE) 5 immediate release tablet       senna-docusate sodium (SENOKOT S) 8.6-50 mg per tablet TAKE ONE TABLET BY MOUTH TWICE A DAY AS NEEDED FOR CONSTIPATION      simvastatin (ZOCOR) 20 mg tablet Take 20 mg by mouth      tiZANidine (ZANAFLEX) 4 mg tablet       vitamin B-12 (VITAMIN B-12) 500 mcg tablet TAKE 1 TABLET (500 MCG TOTAL) BY MOUTH DAILY.       No current facility-administered medications for this visit.        Allergies   Allergen Reactions    Acetaminophen Hives     Pt reports rash on feet and itchy sensation tongue    Other Other (See Comments)    Penicillins      Other reaction(s): Other (See Comments)  hives, elsy. ancef 3/27/03         Review of Systems    Video Exam    There were no vitals filed for this visit.    Physical Exam     Behavioral Health Psychotherapy Progress Note    Psychotherapy Provided: Individual Psychotherapy     1. Bipolar affective disorder, currently depressed, moderate (HCC)        2. Generalized anxiety disorder            Goals addressed in session: Goal 1 and Goal 2     DATA:     Oralia and therapist spoke about her  "current progress/stressors. She explained that she has been doing well since last session. She spoke about how she has been on a \"baking and cooking spree\". Oralia explained all the items she cooked for her family and how cooking and baking gives her a way to de-stress. She explained that things have been \"okay\" at home, but they will soon be better now that her daughter is finally moving out as of Nov 2nd. She spoke about how she is really happy about this due to wanting her personal space back as well as now she will have better boundaries with her daughter. Her daughter will be moving close so Oralia can still help out with her grandson. She explained that financial ceballos, she expects Oralia to pay for babysitting as well as his tuition to his school. She stated that she is not taking no for an answer and is putting her foot down in regards to this. Therapist agreed. Oralia spoke about her hopes for the holidays and fun events they have planned. She is looking forward to it. But for now, she continues to take it one day at a time. Therapist agreed. Oralia and therapist will continue to discuss the above mentioned and progress in upcoming sessions.     During this session, this clinician used the following therapeutic modalities: Engagement Strategies, Client-centered Therapy, Cognitive Behavioral Therapy, Solution-Focused Therapy, and Supportive Psychotherapy    Substance Abuse was not addressed during this session. If the client is diagnosed with a co-occurring substance use disorder, please indicate any changes in the frequency or amount of use: N/A. Stage of change for addressing substance use diagnoses: No substance use/Not applicable    ASSESSMENT:  Oralia Ambrocio presents with a Euthymic/ normal mood.     her affect is Normal range and intensity, which is congruent, with her mood and the content of the session. The client has made progress on their goals.     Oralia Ambrocio presents with a none risk " "of suicide, none risk of self-harm, and none risk of harm to others.    For any risk assessment that surpasses a \"low\" rating, a safety plan must be developed.    A safety plan was indicated: no  If yes, describe in detail     PLAN: Between sessions, Oralia Ambrocio will continue to work on the above mentioned for next session. At the next session, the therapist will use Engagement Strategies, Client-centered Therapy, Cognitive Behavioral Therapy, Solution-Focused Therapy, and Supportive Psychotherapy to address her stressors.    Behavioral Health Treatment Plan and Discharge Planning: Oralia Ambrocio is aware of and agrees to continue to work on their treatment plan. They have identified and are working toward their discharge goals. yes    Visit start and stop times:    10/23/24  Start Time: 1516  Stop Time: 1552  Total Visit Time: 36 minutes        "

## 2024-10-23 NOTE — TELEPHONE ENCOUNTER
PT requsted to therapist Edel Gibson to reschedule ALYSSA appt with psych provider    Attempted to contact pt to reschedule ALYSSA appt with Anusha Landry    No answer  LVM providing office phone number.

## 2024-10-28 ENCOUNTER — TELEPHONE (OUTPATIENT)
Age: 59
End: 2024-10-28

## 2024-10-28 NOTE — TELEPHONE ENCOUNTER
Patient is calling regarding cancelling an appointment.    Date/Time: 10/28/24 at 9:00 am    Reason: Conflicting Schedule (has to be at her son's school)    Patient was rescheduled: YES [] NO [x]  If yes, when was Patient reschedule for:     Patient requesting call back to reschedule: YES [] NO [x]

## 2024-11-11 ENCOUNTER — TELEPHONE (OUTPATIENT)
Age: 59
End: 2024-11-11

## 2024-11-11 NOTE — TELEPHONE ENCOUNTER
Patient is calling regarding cancelling an appointment.    Date/Time: 11/11 @ 9am    Reason: patient    Patient was rescheduled: YES [] NO [x]  If yes, when was Patient reschedule for:     Patient requesting call back to reschedule: YES [] NO [x]

## 2024-11-25 ENCOUNTER — TELEMEDICINE (OUTPATIENT)
Dept: BEHAVIORAL/MENTAL HEALTH CLINIC | Facility: CLINIC | Age: 59
End: 2024-11-25
Payer: COMMERCIAL

## 2024-11-25 DIAGNOSIS — F41.1 GENERALIZED ANXIETY DISORDER: ICD-10-CM

## 2024-11-25 DIAGNOSIS — F31.32 BIPOLAR AFFECTIVE DISORDER, CURRENTLY DEPRESSED, MODERATE (HCC): Primary | ICD-10-CM

## 2024-11-25 PROCEDURE — 90837 PSYTX W PT 60 MINUTES: CPT

## 2024-11-25 NOTE — PSYCH
Virtual Regular Visit    Verification of patient location:    Patient is located at Home in the following state in which I hold an active license [place]      Assessment/Plan:    Problem List Items Addressed This Visit       Generalized anxiety disorder     Other Visit Diagnoses         Bipolar affective disorder, currently depressed, moderate (HCC)    -  Primary            Goals addressed in session: Goal 1 and Goal 2          Reason for visit is No chief complaint on file.       Encounter provider Edel Gibson LPC      Recent Visits  No visits were found meeting these conditions.  Showing recent visits within past 7 days and meeting all other requirements  Today's Visits  Date Type Provider Dept   11/25/24 Telemedicine Edel Gibson LPC [facility] Therapist [address]   Showing today's visits and meeting all other requirements  Future Appointments  No visits were found meeting these conditions.  Showing future appointments within next 150 days and meeting all other requirements       The patient was identified by name and date of birth. Oralia Ambrocio was informed that this is a telemedicine visit and that the visit is being conducted throughthe Epic Embedded platform. She agrees to proceed..  My office door was closed. No one else was in the room.  She acknowledged consent and understanding of privacy and security of the video platform. The patient has agreed to participate and understands they can discontinue the visit at any time.    Patient is aware this is a billable service.     Subjective  Oralia Ambrocio is a 59 y.o. female .      HPI     Past Medical History:   Diagnosis Date    Anxiety     Bipolar disorder (HCC)     Chronic pain disorder     History of Rin-en-Y gastric bypass     Hyperlipidemia     Hypertension     Opioid dependence in remission (HCC)     Panic attack     Sleep difficulties        Past Surgical History:   Procedure Laterality Date    CHOLECYSTECTOMY      GASTRIC BYPASS      TOTAL  KNEE ARTHROPLASTY Left        Current Outpatient Medications   Medication Sig Dispense Refill    albuterol (ACCUNEB) 1.25 MG/3ML nebulizer solution Inhale 1.25 mg every 6 (six) hours as needed      Albuterol Sulfate 108 (90 Base) MCG/ACT AEPB Inhale 2 puffs every 4 (four) hours as needed      aspirin 325 mg tablet Take 325 mg by mouth daily      atoMOXetine (STRATTERA) 40 mg capsule Take 1 capsule (40 mg total) by mouth daily 30 capsule 2    Cholecalciferol (Vitamin D3) 125 MCG (5000 UT) CAPS       clindamycin (CLEOCIN) 150 mg capsule       cyanocobalamin (VITAMIN B-12) 100 MCG tablet Take 100 mcg by mouth daily      ergocalciferol (VITAMIN D2) 50,000 units       famotidine (PEPCID) 20 mg tablet       Multiple Vitamin (Tab-A-Herman/Beta Carotene) TABS TAKE 1 TABLET BY MOUTH DAILY      multivitamin (THERAGRAN) TABS Take 1 tablet by mouth daily      oxyCODONE (ROXICODONE) 5 immediate release tablet       senna-docusate sodium (SENOKOT S) 8.6-50 mg per tablet TAKE ONE TABLET BY MOUTH TWICE A DAY AS NEEDED FOR CONSTIPATION      simvastatin (ZOCOR) 20 mg tablet Take 20 mg by mouth      tiZANidine (ZANAFLEX) 4 mg tablet       vitamin B-12 (VITAMIN B-12) 500 mcg tablet TAKE 1 TABLET (500 MCG TOTAL) BY MOUTH DAILY.       No current facility-administered medications for this visit.        Allergies   Allergen Reactions    Acetaminophen Hives     Pt reports rash on feet and itchy sensation tongue    Other Other (See Comments)    Penicillins      Other reaction(s): Other (See Comments)  hives, elsy. ancef 3/27/03         Review of Systems    Video Exam    There were no vitals filed for this visit.    Physical Exam     Behavioral Health Psychotherapy Progress Note    Psychotherapy Provided: Individual Psychotherapy     1. Bipolar affective disorder, currently depressed, moderate (HCC)        2. Generalized anxiety disorder            Goals addressed in session: Goal 1 and Goal 2     DATA:     Oralia and therapist spoke about her  current progress/stressors. She explained that she hasn't been having a good month since therapist saw her last. She was very tearful when discussing this. Oralia explained the holidays are a hard time for her due to lost family members as well as not being able to see or spend time with everyone. She explained that she has been trying to keep herself busy, but does get into a situation where she gets very upset and cries continuously. She stated that she despite feeling like this, she has had some good things happen to her and her family. Oralia was able to finally get her daughter who has been living with her for almost 3 years to move out on her own. She is living very close due to Oralia still helping raise her grandson, but is happy she and her youngest daughter have their space back. She feels more confident since being able to take back her home. Oralia continues to have some issues with raising her grandson due to some issues with his school and them not following his IEP as he has been falling behind with reading/spelling. Oralia explained that she and her daughter were able to stand up to the grandson's school and teacher due to trying to advocate for his needs. They were able to get his accommodations met. She explained that other than this, she looks forward to spending Thanksgiving dinner with her family and continuing the traditions. Oralia and therapist will continue to work on the above mentioned and progress in upcoming sessions.     During this session, this clinician used the following therapeutic modalities: Engagement Strategies, Client-centered Therapy, Cognitive Behavioral Therapy, Solution-Focused Therapy, and Supportive Psychotherapy    Substance Abuse was not addressed during this session. If the client is diagnosed with a co-occurring substance use disorder, please indicate any changes in the frequency or amount of use: N/A. Stage of change for addressing substance use diagnoses: No  "substance use/Not applicable    ASSESSMENT:  Oralia Ambrocio presents with a Labile mood.     her affect is Normal range and intensity and Tearful, which is congruent, with her mood and the content of the session. The client has made progress on their goals.     Oralia Ambrocio presents with a none risk of suicide, none risk of self-harm, and none risk of harm to others.    For any risk assessment that surpasses a \"low\" rating, a safety plan must be developed.    A safety plan was indicated: no  If yes, describe in detail     PLAN: Between sessions, Oralia Ambrocio will continue to work on the above mentioned for next session. At the next session, the therapist will use Engagement Strategies, Client-centered Therapy, Cognitive Behavioral Therapy, Solution-Focused Therapy, and Supportive Psychotherapy to address her stressors.    Behavioral Health Treatment Plan and Discharge Planning: Oralia Ambrocio is aware of and agrees to continue to work on their treatment plan. They have identified and are working toward their discharge goals. yes    Visit start and stop times:    11/25/24  Start Time: 0907  Stop Time: 1004  Total Visit Time: 57 minutes        "

## 2025-01-20 ENCOUNTER — TELEMEDICINE (OUTPATIENT)
Dept: BEHAVIORAL/MENTAL HEALTH CLINIC | Facility: CLINIC | Age: 60
End: 2025-01-20
Payer: COMMERCIAL

## 2025-01-20 DIAGNOSIS — F41.1 GENERALIZED ANXIETY DISORDER: ICD-10-CM

## 2025-01-20 DIAGNOSIS — F31.32 BIPOLAR AFFECTIVE DISORDER, CURRENTLY DEPRESSED, MODERATE (HCC): Primary | ICD-10-CM

## 2025-01-20 PROCEDURE — 90834 PSYTX W PT 45 MINUTES: CPT

## 2025-01-20 NOTE — PSYCH
Virtual Regular Visit    Verification of patient location:    Patient is located at Home in the following state in which I hold an active license [place]      Assessment/Plan:    Problem List Items Addressed This Visit       Generalized anxiety disorder     Other Visit Diagnoses         Bipolar affective disorder, currently depressed, moderate (HCC)    -  Primary            Goals addressed in session: Goal 1 and Goal 2     Depression Follow-up Plan Completed: No    Reason for visit is No chief complaint on file.       Encounter provider Edel Gibson LPC      Recent Visits  No visits were found meeting these conditions.  Showing recent visits within past 7 days and meeting all other requirements  Today's Visits  Date Type Provider Dept   01/20/25 Telemedicine Edel Gibson LPC [facility] Therapist [address]   Showing today's visits and meeting all other requirements  Future Appointments  No visits were found meeting these conditions.  Showing future appointments within next 150 days and meeting all other requirements       The patient was identified by name and date of birth. Oralia Ambrocio was informed that this is a telemedicine visit and that the visit is being conducted throughthe Epic Embedded platform. She agrees to proceed..  My office door was closed. No one else was in the room.  She acknowledged consent and understanding of privacy and security of the video platform. The patient has agreed to participate and understands they can discontinue the visit at any time.    Patient is aware this is a billable service.     Subjective  Oralia Ambrocio is a 59 y.o. female .      HPI     Past Medical History:   Diagnosis Date    Anxiety     Bipolar disorder (HCC)     Chronic pain disorder     History of Rin-en-Y gastric bypass     Hyperlipidemia     Hypertension     Opioid dependence in remission (HCC)     Panic attack     Sleep difficulties        Past Surgical History:   Procedure Laterality Date     CHOLECYSTECTOMY      GASTRIC BYPASS      TOTAL KNEE ARTHROPLASTY Left        Current Outpatient Medications   Medication Sig Dispense Refill    albuterol (ACCUNEB) 1.25 MG/3ML nebulizer solution Inhale 1.25 mg every 6 (six) hours as needed      Albuterol Sulfate 108 (90 Base) MCG/ACT AEPB Inhale 2 puffs every 4 (four) hours as needed      aspirin 325 mg tablet Take 325 mg by mouth daily      atoMOXetine (STRATTERA) 40 mg capsule Take 1 capsule (40 mg total) by mouth daily 30 capsule 2    Cholecalciferol (Vitamin D3) 125 MCG (5000 UT) CAPS       clindamycin (CLEOCIN) 150 mg capsule       cyanocobalamin (VITAMIN B-12) 100 MCG tablet Take 100 mcg by mouth daily      ergocalciferol (VITAMIN D2) 50,000 units       famotidine (PEPCID) 20 mg tablet       Multiple Vitamin (Tab-A-Herman/Beta Carotene) TABS TAKE 1 TABLET BY MOUTH DAILY      multivitamin (THERAGRAN) TABS Take 1 tablet by mouth daily      oxyCODONE (ROXICODONE) 5 immediate release tablet       senna-docusate sodium (SENOKOT S) 8.6-50 mg per tablet TAKE ONE TABLET BY MOUTH TWICE A DAY AS NEEDED FOR CONSTIPATION      simvastatin (ZOCOR) 20 mg tablet Take 20 mg by mouth      tiZANidine (ZANAFLEX) 4 mg tablet       vitamin B-12 (VITAMIN B-12) 500 mcg tablet TAKE 1 TABLET (500 MCG TOTAL) BY MOUTH DAILY.       No current facility-administered medications for this visit.        Allergies   Allergen Reactions    Acetaminophen Hives     Pt reports rash on feet and itchy sensation tongue    Other Other (See Comments)    Penicillins      Other reaction(s): Other (See Comments)  hives, elsy. ancef 3/27/03         Review of Systems    Video Exam    There were no vitals filed for this visit.    Physical Exam     Behavioral Health Psychotherapy Progress Note    Psychotherapy Provided: Individual Psychotherapy     1. Bipolar affective disorder, currently depressed, moderate (HCC)        2. Generalized anxiety disorder            Goals addressed in session: Goal 1 and Goal 2      DATA:     Oralia and therapist spoke about her current progress/stressors. She explained how things have been going for her since last session. Oralia and therapist spoke about attendance and calling the office when unable to make appointments. She spoke about how she spoke to someone about cancelling the last appointment, but it wasn't cancelled. Oralia explained that for the last month she has been trying to help her family through a lot. She spoke about how wonderful the holidays went and how she had everyone at her home. She explained the games they played, the food that was prepared, and how she was able to spend a lot of time with the grandchildren. She stated that after that, most of them all came down with the stomach virus and the flu. She spoke about how she has been trying to take care of them as well as herself. She has been cleaning her home as well as disinfecting the bedding and whatever else she can so it doesn't spread. Therapist agreed. Oralia is hoping she doesn't catch it, but was worried for her oldest daughter who caught the flu and had a high fever. She called her ex  and spoke about it with him and they are checking on her often to make sure she is okay. Oralia and therapist spoke about coping skills as well as importance of self-care. She agreed and spoke about ways she is providing this for herself. Therapist agreed. She also spoke about how she has her neurologist appointment coming up before the next session and how she will fill therapist in then. Therapist agreed. Oralia and therapist will continue to discuss the above mentioned and progress in upcoming sessions.     During this session, this clinician used the following therapeutic modalities: Engagement Strategies, Client-centered Therapy, Cognitive Behavioral Therapy, Solution-Focused Therapy, and Supportive Psychotherapy    Substance Abuse was not addressed during this session. If the client is diagnosed with a  "co-occurring substance use disorder, please indicate any changes in the frequency or amount of use: N/A. Stage of change for addressing substance use diagnoses: No substance use/Not applicable    ASSESSMENT:  Oralia Ambrocio presents with a Euthymic/ normal mood.     her affect is Normal range and intensity and [place], which is congruent, with her mood and the content of the session. The client has made progress on their goals.     Oralia Ambrocio presents with a none risk of suicide, none risk of self-harm, and none risk of harm to others.    For any risk assessment that surpasses a \"low\" rating, a safety plan must be developed.    A safety plan was indicated: no  If yes, describe in detail     PLAN: Between sessions, Oralia Ambrocio will continue to work on the above mentioned for next session. At the next session, the therapist will use Engagement Strategies, Client-centered Therapy, Cognitive Behavioral Therapy, Solution-Focused Therapy, and Supportive Psychotherapy to address her stressors.    Behavioral Health Treatment Plan and Discharge Planning: Oralia Ambrocio is aware of and agrees to continue to work on their treatment plan. They have identified and are working toward their discharge goals. yes    Depression Follow-up Plan Completed: No    Visit start and stop times:    01/20/25  Start Time: 0908  Stop Time: 0947  Total Visit Time: 39 minutes        "

## 2025-01-23 ENCOUNTER — TELEPHONE (OUTPATIENT)
Dept: PSYCHIATRY | Facility: CLINIC | Age: 60
End: 2025-01-23

## 2025-01-23 NOTE — TELEPHONE ENCOUNTER
Left voicemail informing patient and/or parent/guardian of the Psych Encounter form needing to be signed as a requirement from the insurance company for billing purposes. Patient can access form via [employer] and sign electronically.     Please make patient aware this form must be signed for each visit as a requirement to continue future visits with provider.    Virtual encounter form 1/20/25  call #1

## 2025-01-29 ENCOUNTER — TELEPHONE (OUTPATIENT)
Dept: PSYCHIATRY | Facility: CLINIC | Age: 60
End: 2025-01-29

## 2025-02-03 ENCOUNTER — TELEMEDICINE (OUTPATIENT)
Dept: BEHAVIORAL/MENTAL HEALTH CLINIC | Facility: CLINIC | Age: 60
End: 2025-02-03
Payer: COMMERCIAL

## 2025-02-03 DIAGNOSIS — F41.1 GENERALIZED ANXIETY DISORDER: ICD-10-CM

## 2025-02-03 DIAGNOSIS — F31.32 BIPOLAR AFFECTIVE DISORDER, CURRENTLY DEPRESSED, MODERATE (HCC): Primary | ICD-10-CM

## 2025-02-03 PROCEDURE — 90832 PSYTX W PT 30 MINUTES: CPT

## 2025-02-03 NOTE — PSYCH
Virtual Regular Visit    Verification of patient location:    Patient is located at Home in the following state in which I hold an active license [place]      Assessment/Plan:    Problem List Items Addressed This Visit       Generalized anxiety disorder     Other Visit Diagnoses         Bipolar affective disorder, currently depressed, moderate (HCC)    -  Primary            Goals addressed in session: Goal 1 and Goal 2     Depression Follow-up Plan Completed: No    Reason for visit is No chief complaint on file.       Encounter provider Edel Gibson LPC      Recent Visits  Date Type Provider Dept   01/29/25 Telephone Edel Gibson L[facility] [address]   Showing recent visits within past 7 days and meeting all other requirements  Today's Visits  Date Type Provider Dept   02/03/25 Telemedicine Edel Gibson LPC [facility] [address]   Showing today's visits and meeting all other requirements  Future Appointments  No visits were found meeting these conditions.  Showing future appointments within next 150 days and meeting all other requirements       The patient was identified by name and date of birth. Oralia Ambrocio was informed that this is a telemedicine visit and that the visit is being conducted throughthe Epic Embedded platform. She agrees to proceed..  My office door was closed. No one else was in the room.  She acknowledged consent and understanding of privacy and security of the video platform. The patient has agreed to participate and understands they can discontinue the visit at any time.    Patient is aware this is a billable service.     Subjective  Oralia Ambrocio is a 59 y.o. female .      HPI     Past Medical History:   Diagnosis Date    Anxiety     Bipolar disorder (HCC)     Chronic pain disorder     History of Rin-en-Y gastric bypass     Hyperlipidemia     Hypertension     Opioid dependence in remission (HCC)     Panic attack     Sleep difficulties        Past Surgical  History:   Procedure Laterality Date    CHOLECYSTECTOMY      GASTRIC BYPASS      TOTAL KNEE ARTHROPLASTY Left        Current Outpatient Medications   Medication Sig Dispense Refill    albuterol (ACCUNEB) 1.25 MG/3ML nebulizer solution Inhale 1.25 mg every 6 (six) hours as needed      Albuterol Sulfate 108 (90 Base) MCG/ACT AEPB Inhale 2 puffs every 4 (four) hours as needed      aspirin 325 mg tablet Take 325 mg by mouth daily      atoMOXetine (STRATTERA) 40 mg capsule Take 1 capsule (40 mg total) by mouth daily 30 capsule 2    Cholecalciferol (Vitamin D3) 125 MCG (5000 UT) CAPS       clindamycin (CLEOCIN) 150 mg capsule       cyanocobalamin (VITAMIN B-12) 100 MCG tablet Take 100 mcg by mouth daily      ergocalciferol (VITAMIN D2) 50,000 units       famotidine (PEPCID) 20 mg tablet       Multiple Vitamin (Tab-A-Herman/Beta Carotene) TABS TAKE 1 TABLET BY MOUTH DAILY      multivitamin (THERAGRAN) TABS Take 1 tablet by mouth daily      oxyCODONE (ROXICODONE) 5 immediate release tablet       senna-docusate sodium (SENOKOT S) 8.6-50 mg per tablet TAKE ONE TABLET BY MOUTH TWICE A DAY AS NEEDED FOR CONSTIPATION      simvastatin (ZOCOR) 20 mg tablet Take 20 mg by mouth      tiZANidine (ZANAFLEX) 4 mg tablet       vitamin B-12 (VITAMIN B-12) 500 mcg tablet TAKE 1 TABLET (500 MCG TOTAL) BY MOUTH DAILY.       No current facility-administered medications for this visit.        Allergies   Allergen Reactions    Acetaminophen Hives     Pt reports rash on feet and itchy sensation tongue    Other Other (See Comments)    Penicillins      Other reaction(s): Other (See Comments)  hives, elsy. ancef 3/27/03         Review of Systems    Video Exam    There were no vitals filed for this visit.    Physical Exam     Behavioral Health Psychotherapy Progress Note    Psychotherapy Provided: Individual Psychotherapy     1. Bipolar affective disorder, currently depressed, moderate (HCC)        2. Generalized anxiety disorder            Goals  addressed in session: Goal 1 and Goal 2     DATA:     Oralia and therapist spoke about her current progress/stressors. She explained that she has been struggling with a situation with her insurance. She was supposed to get surgery on her back next week and how the insurance company is going to deny her getting it done until she does physical therapy. After all the pre-op tests and things she has gone for, she presented very upset due to this. Crystal and therapist spoke about her plan as she spoke about her physical limitations due to her injury. She explained the quality of life has has been having and her concerns if she doesn't get this surgery. Crystal and therapist spoke about the plan to call her insurance and see what they need. Therapist suggested Oralia to contact her doctor and see if there is anything else they can do to show medical necessity. She agreed and spoke about how she may also appeal the insurance if they aren't willing to let her get the surgery. Therapist agreed. Therapist reminded Oralia of self-care activities as well as coping skills to use if needed. She agreed. Oralia and therapist will continue to work on the above mentioned for upcoming sessions.     During this session, this clinician used the following therapeutic modalities: Engagement Strategies, Client-centered Therapy, Cognitive Behavioral Therapy, Solution-Focused Therapy, and Supportive Psychotherapy    Substance Abuse was not addressed during this session. If the client is diagnosed with a co-occurring substance use disorder, please indicate any changes in the frequency or amount of use: N/A. Stage of change for addressing substance use diagnoses: No substance use/Not applicable    ASSESSMENT:  Oralia Ambrocio presents with a Euthymic/ normal mood.     her affect is Normal range and intensity, which is congruent, with her mood and the content of the session. The client has made progress on their goals.     Oralia CHAVARRIA  "Cristóbal presents with a none risk of suicide, none risk of self-harm, and none risk of harm to others.    For any risk assessment that surpasses a \"low\" rating, a safety plan must be developed.    A safety plan was indicated: no  If yes, describe in detail     PLAN: Between sessions, Oralia Ambrocio will continue to work on the above mentioned for next session. At the next session, the therapist will use Engagement Strategies, Client-centered Therapy, Cognitive Behavioral Therapy, Solution-Focused Therapy, and Supportive Psychotherapy to address her stressors.    Behavioral Health Treatment Plan and Discharge Planning: Oralia Ambrocio is aware of and agrees to continue to work on their treatment plan. They have identified and are working toward their discharge goals. yes    Depression Follow-up Plan Completed: No    Visit start and stop times:    02/03/25  Start Time: 0913  Stop Time: 0930  Total Visit Time: 17 minutes      "

## 2025-03-03 ENCOUNTER — TELEPHONE (OUTPATIENT)
Age: 60
End: 2025-03-03

## 2025-03-03 NOTE — TELEPHONE ENCOUNTER
Patient is calling regarding cancelling an appointment.    Date/Time: 3/3/2025 9am    Reason: patient had surgery and is not feeling well, she did not have the correct number to call sooner    Patient was rescheduled: YES [] NO [x]  If yes, when was Patient reschedule for: patient will be at next scheduled visit    Patient requesting call back to reschedule: YES [] NO [x]

## 2025-03-31 ENCOUNTER — TELEMEDICINE (OUTPATIENT)
Dept: BEHAVIORAL/MENTAL HEALTH CLINIC | Facility: CLINIC | Age: 60
End: 2025-03-31
Payer: COMMERCIAL

## 2025-03-31 DIAGNOSIS — F31.32 BIPOLAR AFFECTIVE DISORDER, CURRENTLY DEPRESSED, MODERATE (HCC): Primary | ICD-10-CM

## 2025-03-31 DIAGNOSIS — F41.1 GENERALIZED ANXIETY DISORDER: ICD-10-CM

## 2025-03-31 PROCEDURE — 90837 PSYTX W PT 60 MINUTES: CPT

## 2025-03-31 NOTE — BH TREATMENT PLAN
"Outpatient Behavioral Health Psychotherapy Treatment Plan    Oralia Ambrocio  1965     Date of Initial Psychotherapy Assessment: 03/04/2024   Date of Current Treatment Plan: 03/31/25  Treatment Plan Target Date: 09/30/2025  Treatment Plan Expiration Date: 09/30/2025    Diagnosis:   1. Bipolar affective disorder, currently depressed, moderate (HCC)        2. Generalized anxiety disorder            Area(s) of Need: Concentration/Focus    Long Term Goal 1 (in the client's own words): \" I feel like I am unable to focus/concentrate\"    Stage of Change: Action    Target Date for completion: 09/30/2025     Anticipated therapeutic modalities: Engagement Strategies, Client-centered Therapy, Cognitive Behavioral Therapy, Solution-Focused Therapy, and Supportive Psychotherapy      People identified to complete this goal: Oralia and this therapist, Edel      Objective 1: (identify the means of measuring success in meeting the objective): A. Oralia will continue to attend biweekly therapy appointments, B. Oralia will continue to work on learning at least 2-3 coping skills/ways to focus on her online course such as utilizing time management skills. C. Oralia will keep track of tasks/appointments on a to-do list, if needed, in order to help her stay on task.    03/31/2025 Update: Oralia has been working on getting through her physical issues at this time. She has been attending appointments, advocating for herself as well as needing to make time for self-care. She states that she continues to work on focusing/concentrating at this time. Oralia would like to continue to work on this goal as it's an ongoing goal.     Long Term Goal 2 (in the client's own words): \" I have a hard time sleeping and I think it's related to my anxiety\"    Stage of Change: Action    Target Date for completion: 09/30/2025     Anticipated therapeutic modalities: Engagement Strategies, Client-centered Therapy, Cognitive Behavioral Therapy, " Solution-Focused Therapy, and Supportive Psychotherapy      People identified to complete this goal: Oralia and this therapist, Edel.       Objective 1: (identify the means of measuring success in meeting the objective): A. Oralia will continue to attend biweekly therapy sessions, B. Oralia and therapist will continue to work on learning at least 3-4 coping skills/de-escalation skills/relaxation skills in order to help her try to relax and fall asleep. C. Oralia and therapist will continue to work on making a nightly routine so she is able to relax and be able to fall asleep. D. Oralia will continue meet with her psychiatrist and discuss medications (if necessary) and will adhere to that routine as prescribed.       03/31/2025 Update: Oralia reports still struggling with her sleep. She recently had surgery on her back and has been working through that adjustment. She has also been able to cut back on drinking too much coffee and reports only drinking 1 cup per day. Oralia would like to continue this goal as it's an ongoing goal.     I am currently under the care of a [facility] psychiatric provider: No    My [facility] psychiatric provider is: N/A    I am currently taking psychiatric medications: No    I feel that I will be ready for discharge from mental health care when I reach the following (measurable goal/objective): When Oralia is able to apply learned skills/techniques without prompting or support.     For children and adults who have a legal guardian:   Has there been any change to custody orders and/or guardianship status? NA. If yes, attach updated documentation.    I have not yet created my Crisis Plan and have been offered a copy of this plan    Behavioral Health Treatment Plan [facility]: Diagnosis and Treatment Plan explained to Oralia Ambrocio acknowledges an understanding of their diagnosis. rOalia Ambrocio agrees to this treatment plan.    I have been offered a copy of this  Treatment Plan. yes

## 2025-03-31 NOTE — PSYCH
Virtual Regular VisitName: Oralia Ambrocio      : 1965      MRN: [id number]  Encounter Provider: Edel Gibson LPC  Encounter Date: 3/31/2025   Encounter department: [facility] [address]  :  Assessment & Plan  Bipolar affective disorder, currently depressed, moderate (HCC)         Generalized anxiety disorder             Goals addressed in session: Goal 1 and Goal 2     DATA:     Oralia and therapist worked on and completed the treatment plan. Oralia will sign the treatment plan via [employer] due to the appointment being telehealth. Oralia also spoke about her progress/stressors. She explained that things have been very stressful for her and her family recently. She explained that she has been struggling physically due to needing to get back surgery. She got the surgery and has been also doing PT as well. Oralia explained her limitations as well as pain. She is hoping to be better for the end of next month as she and a few family members are planning to take a trip to [place]. She spoke about how she is looking forward to this such as the attractions, food, etc. Oralia also spoke about an issue where she had to advocate for herself as well as her grandson with their insurance companies. Oralia had an issue with them approving her back surgery due to them wanting her to do more PT. She explained that the doctor called the insurance company and was able to do it the day of, to which caused her stress. Oralia had to fight with her grandson's insurance company as he was getting surgery for a tooth that was in his gums and they wouldn't approve a certain amount of anaesthesia time. She had to pay out of pocket, to which therapist suggested her call the [facility] assistance office as it was [facility] funded insurance. She agreed and will do this. Other than this, Oralia has been doing well otherwise. Therapist spoke about the importance of self-care activities to utilize when  "needed. She agreed. Oralia will continue to discuss the above mentioned and progress in upcoming sessions.     During this session, this clinician used the following therapeutic modalities: Engagement Strategies, Client-centered Therapy, Cognitive Behavioral Therapy, Solution-Focused Therapy, and Supportive Psychotherapy    Substance Abuse was not addressed during this session. If the client is diagnosed with a co-occurring substance use disorder, please indicate any changes in the frequency or amount of use: N/A. Stage of change for addressing substance use diagnoses: No substance use/Not applicable    ASSESSMENT:  Oralia presents with a Euthymic/ normal mood. Oralia's affect is Normal range and intensity, which is congruent, with their mood and the content of the session. The client has made progress on their goals as evidenced by working on and completing treatment plan goals.    Oralia presents with a none risk of suicide, none risk of self-harm, and none risk of harm to others.    For any risk assessment that surpasses a \"low\" rating, a safety plan must be developed.    A safety plan was indicated: no  If yes, describe in detail     PLAN: Between sessions, Oralia will continue to work on the above mentioned for next session. At the next session, the therapist will use Engagement Strategies, Client-centered Therapy, Cognitive Behavioral Therapy, Solution-Focused Therapy, and Supportive Psychotherapy to address her stressors.    Behavioral Health Treatment Plan [facility]: Diagnosis and Treatment Plan explained to Oralia, Oralia relates understanding diagnosis and is agreeable to Treatment Plan. Yes     Depression Follow-up Plan Completed: Yes     Reason for visit is No chief complaint on file.     Recent Visits  No visits were found meeting these conditions.  Showing recent visits within past 7 days and meeting all other requirements  Today's Visits  Date Type Provider Dept   03/31/25 Servando Hollingsworth " MARIAJOSE Gibson [facility] [address]   Showing today's visits and meeting all other requirements  Future Appointments  No visits were found meeting these conditions.  Showing future appointments within next 150 days and meeting all other requirements     History of Present Illness     HPI    Past Medical History   Past Medical History:   Diagnosis Date    Anxiety     Bipolar disorder (HCC)     Chronic pain disorder     History of Rin-en-Y gastric bypass     Hyperlipidemia     Hypertension     Opioid dependence in remission (HCC)     Panic attack     Sleep difficulties      Past Surgical History:   Procedure Laterality Date    CHOLECYSTECTOMY      GASTRIC BYPASS      TOTAL KNEE ARTHROPLASTY Left      Current Outpatient Medications   Medication Instructions    albuterol (ACCUNEB) 1.25 mg, Inhalation, Every 6 hours PRN    Albuterol Sulfate 108 (90 Base) MCG/ACT AEPB 2 puffs, Inhalation, Every 4 hours PRN    aspirin 325 mg, Oral, Daily    atoMOXetine (STRATTERA) 40 mg, Oral, Daily    Cholecalciferol (Vitamin D3) 125 MCG (5000 UT) CAPS No dose, route, or frequency recorded.    clindamycin (CLEOCIN) 150 mg capsule No dose, route, or frequency recorded.    cyanocobalamin (VITAMIN B-12) 100 mcg, Oral, Daily    ergocalciferol (VITAMIN D2) 50,000 units No dose, route, or frequency recorded.    famotidine (PEPCID) 20 mg tablet     Multiple Vitamin (Tab-A-Herman/Beta Carotene) TABS TAKE 1 TABLET BY MOUTH DAILY    multivitamin (THERAGRAN) TABS 1 tablet, Oral, Daily    oxyCODONE (ROXICODONE) 5 immediate release tablet No dose, route, or frequency recorded.    senna-docusate sodium (SENOKOT S) 8.6-50 mg per tablet TAKE ONE TABLET BY MOUTH TWICE A DAY AS NEEDED FOR CONSTIPATION    simvastatin (ZOCOR) 20 mg, Oral    tiZANidine (ZANAFLEX) 4 mg tablet No dose, route, or frequency recorded.    vitamin B-12 (VITAMIN B-12) 500 mcg tablet TAKE 1 TABLET (500 MCG TOTAL) BY MOUTH DAILY.     Allergies   Allergen Reactions     "Acetaminophen Hives     Pt reports rash on feet and itchy sensation tongue    Other Other (See Comments)    Penicillins      Other reaction(s): Other (See Comments)  hives, elsy. ancef 3/27/03         Objective   There were no vitals taken for this visit.    Video Exam  Physical Exam     Administrative Statements   Encounter provider Edel Gibson LPC    The Patient is located at Home and in the following state in which I hold an active license [place].    The patient was identified by name and date of birth. Oralia Ambrocio was informed that this is a telemedicine visit and that the visit is being conducted through the Epic Embedded platform. She agrees to proceed..  My office door was closed. No one else was in the room.  She acknowledged consent and understanding of privacy and security of the video platform. The patient has agreed to participate and understands they can discontinue the visit at any time. Therapist completed this session at home despite \"four wall\" rule due to issues with availability.     I have spent a total time of 54 minutes in caring for this patient on the day of the visit/encounter including Counseling / Coordination of care, not including the time spent for establishing the audio/video connection.    Visit Time  Start Time: 0903  Stop Time: 0957  Total Visit Time: 54 minutes  "

## 2025-04-14 ENCOUNTER — TELEPHONE (OUTPATIENT)
Dept: PSYCHIATRY | Facility: CLINIC | Age: 60
End: 2025-04-14

## 2025-04-14 NOTE — TELEPHONE ENCOUNTER
Writer called to inform patient that she needs to sign the Virtual Care Behavorial Health Consent needs to be signed in order to have her visit for today. Consent has been sent to patients mychart.

## 2025-05-12 ENCOUNTER — TELEPHONE (OUTPATIENT)
Age: 60
End: 2025-05-12

## 2025-05-12 NOTE — TELEPHONE ENCOUNTER
Patient is calling regarding cancelling an appointment.  Date/Time: 5/12/25 at 9 am  Writer Notified Provider    Reason: Pt is having Emergency Surgery on 5/15/25 and does not feel well now.    Patient was rescheduled: NO [x]  If yes, when was Patient reschedule for: Pt states she will call back after the surgery when she knows her schedule better.  Patient requesting call back to reschedule: YES [] NO [x]

## 2025-07-10 ENCOUNTER — DOCUMENTATION (OUTPATIENT)
Dept: BEHAVIORAL/MENTAL HEALTH CLINIC | Facility: CLINIC | Age: 60
End: 2025-07-10

## 2025-07-10 DIAGNOSIS — F31.32 BIPOLAR AFFECTIVE DISORDER, CURRENTLY DEPRESSED, MODERATE (HCC): Primary | ICD-10-CM

## 2025-07-10 DIAGNOSIS — F41.1 GENERALIZED ANXIETY DISORDER: ICD-10-CM

## 2025-07-10 NOTE — PROGRESS NOTES
Psychotherapy Discharge Summary    Preferred Name: Oralia Ambrocio  YOB: 1965    Admission date to psychotherapy: 03/24/2024    Referred by: Self    Presenting Problem: Oralia was seeking therapy services for grief, depression, and anxiety. She also had some interpersonal issues with her daughter and lack of boundaries/effective communication skills when it came to her daughter living with her in the home with her son.     Course of treatment included : individual therapy     Progress/Outcome of Treatment Goals (brief summary of course of treatment) Oralia was able to set boundaries with her daughter and effectively communicate them. She was able to get her daughter to move out and she was able to reclaim her space in her home again.     Treatment Complications (if any): Issues with NS/cancellations/childcare for her grandson (missed appointments due to having to watch/parent him)    Treatment Progress: fair    Current [facility] Psychiatric Provider: Does not have one at this time.     Discharge Medications include: N/A    Discharge Date: 07/10/2025    Discharge Diagnosis:   1. Bipolar affective disorder, currently depressed, moderate (HCC)        2. Generalized anxiety disorder            Criteria for Discharge: two or more unexcused absences for services    Patient is cleared to return to Edel Gibson LPC for continued treatment.    Rationale: ONLY if she is adherent to scheduling and attending appointments.     Aftercare recommendations include (include specific referral names and phone numbers, if appropriate): Follow up with primary care provider or contact [facility] to schedule future intake assessment for therapy services.     Prognosis: fair

## 2025-07-31 ENCOUNTER — TELEPHONE (OUTPATIENT)
Age: 60
End: 2025-07-31

## 2025-08-05 ENCOUNTER — TELEPHONE (OUTPATIENT)
Age: 60
End: 2025-08-05

## 2025-08-05 ENCOUNTER — TELEPHONE (OUTPATIENT)
Dept: BEHAVIORAL/MENTAL HEALTH CLINIC | Facility: CLINIC | Age: 60
End: 2025-08-05